# Patient Record
Sex: FEMALE | Race: WHITE | NOT HISPANIC OR LATINO | Employment: PART TIME | URBAN - METROPOLITAN AREA
[De-identification: names, ages, dates, MRNs, and addresses within clinical notes are randomized per-mention and may not be internally consistent; named-entity substitution may affect disease eponyms.]

---

## 2017-01-10 ENCOUNTER — COMMUNICATION - HEALTHEAST (OUTPATIENT)
Dept: PEDIATRICS | Facility: CLINIC | Age: 22
End: 2017-01-10

## 2017-12-06 ENCOUNTER — RECORDS - HEALTHEAST (OUTPATIENT)
Dept: ADMINISTRATIVE | Facility: OTHER | Age: 22
End: 2017-12-06

## 2018-01-11 ENCOUNTER — COMMUNICATION - HEALTHEAST (OUTPATIENT)
Dept: PEDIATRICS | Facility: CLINIC | Age: 23
End: 2018-01-11

## 2018-01-16 ENCOUNTER — OFFICE VISIT - HEALTHEAST (OUTPATIENT)
Dept: INTERNAL MEDICINE | Facility: CLINIC | Age: 23
End: 2018-01-16

## 2018-01-16 DIAGNOSIS — R00.2 PALPITATIONS: ICD-10-CM

## 2018-01-16 DIAGNOSIS — R59.0 LYMPHADENOPATHY OF RIGHT CERVICAL REGION: ICD-10-CM

## 2018-01-16 DIAGNOSIS — R51.9 NEW ONSET OF HEADACHES: ICD-10-CM

## 2018-01-16 LAB
ALBUMIN SERPL-MCNC: 4.3 G/DL (ref 3.5–5)
ALP SERPL-CCNC: 80 U/L (ref 45–120)
ALT SERPL W P-5'-P-CCNC: 14 U/L (ref 0–45)
ANION GAP SERPL CALCULATED.3IONS-SCNC: 12 MMOL/L (ref 5–18)
AST SERPL W P-5'-P-CCNC: 20 U/L (ref 0–40)
BILIRUB SERPL-MCNC: 0.9 MG/DL (ref 0–1)
BUN SERPL-MCNC: 12 MG/DL (ref 8–22)
CALCIUM SERPL-MCNC: 10.1 MG/DL (ref 8.5–10.5)
CHLORIDE BLD-SCNC: 104 MMOL/L (ref 98–107)
CO2 SERPL-SCNC: 25 MMOL/L (ref 22–31)
CREAT SERPL-MCNC: 0.69 MG/DL (ref 0.6–1.1)
ERYTHROCYTE [DISTWIDTH] IN BLOOD BY AUTOMATED COUNT: 10.5 % (ref 11–14.5)
ERYTHROCYTE [SEDIMENTATION RATE] IN BLOOD BY WESTERGREN METHOD: 2 MM/HR (ref 0–20)
GFR SERPL CREATININE-BSD FRML MDRD: >60 ML/MIN/1.73M2
GLUCOSE BLD-MCNC: 91 MG/DL (ref 70–125)
HCT VFR BLD AUTO: 45.3 % (ref 35–47)
HGB BLD-MCNC: 15.4 G/DL (ref 12–16)
MCH RBC QN AUTO: 31.6 PG (ref 27–34)
MCHC RBC AUTO-ENTMCNC: 33.9 G/DL (ref 32–36)
MCV RBC AUTO: 93 FL (ref 80–100)
PLATELET # BLD AUTO: 332 THOU/UL (ref 140–440)
PMV BLD AUTO: 6.3 FL (ref 7–10)
POTASSIUM BLD-SCNC: 4.4 MMOL/L (ref 3.5–5)
PROT SERPL-MCNC: 7.9 G/DL (ref 6–8)
RBC # BLD AUTO: 4.86 MILL/UL (ref 3.8–5.4)
SODIUM SERPL-SCNC: 141 MMOL/L (ref 136–145)
T3FREE SERPL-MCNC: 3.5 PG/ML (ref 1.9–3.9)
T4 FREE SERPL-MCNC: 1.1 NG/DL (ref 0.7–1.8)
TSH SERPL DL<=0.005 MIU/L-ACNC: 1.79 UIU/ML (ref 0.3–5)
WBC: 4.8 THOU/UL (ref 4–11)

## 2018-01-16 ASSESSMENT — MIFFLIN-ST. JEOR: SCORE: 1304.04

## 2018-01-19 ENCOUNTER — HOSPITAL ENCOUNTER (OUTPATIENT)
Dept: ULTRASOUND IMAGING | Facility: CLINIC | Age: 23
Discharge: HOME | End: 2018-01-19
Attending: INTERNAL MEDICINE

## 2018-01-19 ENCOUNTER — HOSPITAL ENCOUNTER (OUTPATIENT)
Dept: MRI IMAGING | Facility: CLINIC | Age: 23
Discharge: HOME | End: 2018-01-19
Attending: INTERNAL MEDICINE

## 2018-01-19 DIAGNOSIS — R51.9 NEW ONSET OF HEADACHES: ICD-10-CM

## 2018-01-19 DIAGNOSIS — R59.0 LYMPHADENOPATHY OF RIGHT CERVICAL REGION: ICD-10-CM

## 2018-01-24 ENCOUNTER — OFFICE VISIT - HEALTHEAST (OUTPATIENT)
Dept: INTERNAL MEDICINE | Facility: CLINIC | Age: 23
End: 2018-01-24

## 2018-01-24 DIAGNOSIS — G43.909 HEADACHE, MIGRAINE: ICD-10-CM

## 2018-01-24 DIAGNOSIS — F41.1 GENERALIZED ANXIETY DISORDER: ICD-10-CM

## 2018-05-21 ENCOUNTER — OFFICE VISIT - HEALTHEAST (OUTPATIENT)
Dept: INTERNAL MEDICINE | Facility: CLINIC | Age: 23
End: 2018-05-21

## 2018-05-21 DIAGNOSIS — B07.0 PLANTAR WART: ICD-10-CM

## 2018-05-24 ENCOUNTER — COMMUNICATION - HEALTHEAST (OUTPATIENT)
Dept: INTERNAL MEDICINE | Facility: CLINIC | Age: 23
End: 2018-05-24

## 2018-08-03 ENCOUNTER — OFFICE VISIT - HEALTHEAST (OUTPATIENT)
Dept: INTERNAL MEDICINE | Facility: CLINIC | Age: 23
End: 2018-08-03

## 2018-08-03 DIAGNOSIS — Z11.3 SCREEN FOR STD (SEXUALLY TRANSMITTED DISEASE): ICD-10-CM

## 2018-08-03 LAB — HIV 1+2 AB+HIV1 P24 AG SERPL QL IA: NEGATIVE

## 2018-08-03 ASSESSMENT — MIFFLIN-ST. JEOR: SCORE: 1325.25

## 2018-08-04 LAB
HBV SURFACE AG SERPL QL IA: NEGATIVE
T PALLIDUM AB SER QL: NEGATIVE

## 2018-08-06 LAB
C TRACH DNA SPEC QL PROBE+SIG AMP: NEGATIVE
HCV AB SERPL QL IA: NEGATIVE
N GONORRHOEA DNA SPEC QL NAA+PROBE: NEGATIVE

## 2019-04-15 ENCOUNTER — RECORDS - HEALTHEAST (OUTPATIENT)
Dept: ADMINISTRATIVE | Facility: OTHER | Age: 24
End: 2019-04-15

## 2019-09-25 ENCOUNTER — OFFICE VISIT - HEALTHEAST (OUTPATIENT)
Dept: INTERNAL MEDICINE | Facility: CLINIC | Age: 24
End: 2019-09-25

## 2019-09-25 DIAGNOSIS — J06.9 UPPER RESPIRATORY TRACT INFECTION, UNSPECIFIED TYPE: ICD-10-CM

## 2020-01-03 ENCOUNTER — OFFICE VISIT - HEALTHEAST (OUTPATIENT)
Dept: INTERNAL MEDICINE | Facility: CLINIC | Age: 25
End: 2020-01-03

## 2020-01-03 DIAGNOSIS — Z00.00 ENCOUNTER FOR PREVENTIVE CARE: ICD-10-CM

## 2020-01-03 LAB
ALBUMIN SERPL-MCNC: 4.1 G/DL (ref 3.5–5)
ALP SERPL-CCNC: 107 U/L (ref 45–120)
ALT SERPL W P-5'-P-CCNC: 31 U/L (ref 0–45)
ANION GAP SERPL CALCULATED.3IONS-SCNC: 8 MMOL/L (ref 5–18)
AST SERPL W P-5'-P-CCNC: 25 U/L (ref 0–40)
BILIRUB SERPL-MCNC: 0.7 MG/DL (ref 0–1)
BUN SERPL-MCNC: 10 MG/DL (ref 8–22)
CALCIUM SERPL-MCNC: 9.3 MG/DL (ref 8.5–10.5)
CHLORIDE BLD-SCNC: 107 MMOL/L (ref 98–107)
CHOLEST SERPL-MCNC: 130 MG/DL
CO2 SERPL-SCNC: 24 MMOL/L (ref 22–31)
CREAT SERPL-MCNC: 0.69 MG/DL (ref 0.6–1.1)
ERYTHROCYTE [DISTWIDTH] IN BLOOD BY AUTOMATED COUNT: 10.7 % (ref 11–14.5)
FASTING STATUS PATIENT QL REPORTED: YES
GFR SERPL CREATININE-BSD FRML MDRD: >60 ML/MIN/1.73M2
GLUCOSE BLD-MCNC: 96 MG/DL (ref 70–125)
HBA1C MFR BLD: 4.4 % (ref 3.5–6)
HCT VFR BLD AUTO: 40.3 % (ref 35–47)
HDLC SERPL-MCNC: 52 MG/DL
HGB BLD-MCNC: 14 G/DL (ref 12–16)
LDLC SERPL CALC-MCNC: 59 MG/DL
MCH RBC QN AUTO: 33 PG (ref 27–34)
MCHC RBC AUTO-ENTMCNC: 34.7 G/DL (ref 32–36)
MCV RBC AUTO: 95 FL (ref 80–100)
PLATELET # BLD AUTO: 317 THOU/UL (ref 140–440)
PMV BLD AUTO: 6.5 FL (ref 7–10)
POTASSIUM BLD-SCNC: 4 MMOL/L (ref 3.5–5)
PROT SERPL-MCNC: 7 G/DL (ref 6–8)
RBC # BLD AUTO: 4.25 MILL/UL (ref 3.8–5.4)
SODIUM SERPL-SCNC: 139 MMOL/L (ref 136–145)
TRIGL SERPL-MCNC: 93 MG/DL
TSH SERPL DL<=0.005 MIU/L-ACNC: 1.85 UIU/ML (ref 0.3–5)
WBC: 4.9 THOU/UL (ref 4–11)

## 2020-01-03 ASSESSMENT — MIFFLIN-ST. JEOR: SCORE: 1493.36

## 2020-01-03 ASSESSMENT — PATIENT HEALTH QUESTIONNAIRE - PHQ9: SUM OF ALL RESPONSES TO PHQ QUESTIONS 1-9: 0

## 2020-01-06 LAB
C TRACH DNA SPEC QL PROBE+SIG AMP: NEGATIVE
N GONORRHOEA DNA SPEC QL NAA+PROBE: NEGATIVE

## 2020-02-21 ENCOUNTER — OFFICE VISIT - HEALTHEAST (OUTPATIENT)
Dept: FAMILY MEDICINE | Facility: CLINIC | Age: 25
End: 2020-02-21

## 2020-02-21 DIAGNOSIS — N30.01 ACUTE CYSTITIS WITH HEMATURIA: ICD-10-CM

## 2020-02-21 DIAGNOSIS — R30.0 DYSURIA: ICD-10-CM

## 2020-02-21 DIAGNOSIS — A49.1 GROUP B STREPTOCOCCAL INFECTION: ICD-10-CM

## 2020-02-21 LAB
ALBUMIN UR-MCNC: NEGATIVE MG/DL
APPEARANCE UR: ABNORMAL
BACTERIA #/AREA URNS HPF: ABNORMAL HPF
BILIRUB UR QL STRIP: NEGATIVE
COLOR UR AUTO: YELLOW
GLUCOSE UR STRIP-MCNC: NEGATIVE MG/DL
HGB UR QL STRIP: ABNORMAL
KETONES UR STRIP-MCNC: NEGATIVE MG/DL
LEUKOCYTE ESTERASE UR QL STRIP: ABNORMAL
NITRATE UR QL: NEGATIVE
PH UR STRIP: 5.5 [PH] (ref 5–8)
RBC #/AREA URNS AUTO: ABNORMAL HPF
SP GR UR STRIP: 1.02 (ref 1–1.03)
SQUAMOUS #/AREA URNS AUTO: ABNORMAL LPF
UROBILINOGEN UR STRIP-ACNC: ABNORMAL
WBC #/AREA URNS AUTO: ABNORMAL HPF

## 2020-02-21 ASSESSMENT — MIFFLIN-ST. JEOR: SCORE: 1513.15

## 2020-02-22 LAB — BACTERIA SPEC CULT: ABNORMAL

## 2020-02-24 ENCOUNTER — COMMUNICATION - HEALTHEAST (OUTPATIENT)
Dept: FAMILY MEDICINE | Facility: CLINIC | Age: 25
End: 2020-02-24

## 2020-03-24 ENCOUNTER — RECORDS - HEALTHEAST (OUTPATIENT)
Dept: ADMINISTRATIVE | Facility: OTHER | Age: 25
End: 2020-03-24

## 2020-06-06 ENCOUNTER — OFFICE VISIT - HEALTHEAST (OUTPATIENT)
Dept: FAMILY MEDICINE | Facility: CLINIC | Age: 25
End: 2020-06-06

## 2020-06-06 DIAGNOSIS — R35.0 URINARY FREQUENCY: ICD-10-CM

## 2020-06-06 DIAGNOSIS — N30.90 BLADDER INFECTION: ICD-10-CM

## 2020-06-06 LAB
ALBUMIN UR-MCNC: NEGATIVE MG/DL
APPEARANCE UR: CLEAR
BACTERIA #/AREA URNS HPF: ABNORMAL HPF
BILIRUB UR QL STRIP: NEGATIVE
COLOR UR AUTO: YELLOW
GLUCOSE UR STRIP-MCNC: NEGATIVE MG/DL
HGB UR QL STRIP: ABNORMAL
KETONES UR STRIP-MCNC: NEGATIVE MG/DL
LEUKOCYTE ESTERASE UR QL STRIP: ABNORMAL
NITRATE UR QL: NEGATIVE
PH UR STRIP: 7 [PH] (ref 5–8)
RBC #/AREA URNS AUTO: ABNORMAL HPF
SP GR UR STRIP: 1.01 (ref 1–1.03)
SQUAMOUS #/AREA URNS AUTO: ABNORMAL LPF
UROBILINOGEN UR STRIP-ACNC: ABNORMAL
WBC #/AREA URNS AUTO: ABNORMAL HPF

## 2020-06-08 LAB — BACTERIA SPEC CULT: NORMAL

## 2020-06-28 ENCOUNTER — OFFICE VISIT - HEALTHEAST (OUTPATIENT)
Dept: FAMILY MEDICINE | Facility: CLINIC | Age: 25
End: 2020-06-28

## 2020-06-28 DIAGNOSIS — R30.0 BURNING WITH URINATION: ICD-10-CM

## 2020-06-28 DIAGNOSIS — N30.01 ACUTE CYSTITIS WITH HEMATURIA: ICD-10-CM

## 2020-06-28 DIAGNOSIS — N89.8 VAGINAL DISCHARGE: ICD-10-CM

## 2020-06-28 LAB
ALBUMIN UR-MCNC: ABNORMAL MG/DL
APPEARANCE UR: ABNORMAL
BACTERIA #/AREA URNS HPF: ABNORMAL HPF
BILIRUB UR QL STRIP: NEGATIVE
CLUE CELLS: NORMAL
COLOR UR AUTO: YELLOW
GLUCOSE UR STRIP-MCNC: NEGATIVE MG/DL
HGB UR QL STRIP: ABNORMAL
KETONES UR STRIP-MCNC: NEGATIVE MG/DL
LEUKOCYTE ESTERASE UR QL STRIP: ABNORMAL
NITRATE UR QL: NEGATIVE
PH UR STRIP: 6 [PH] (ref 5–8)
RBC #/AREA URNS AUTO: ABNORMAL HPF
SP GR UR STRIP: 1.02 (ref 1–1.03)
SQUAMOUS #/AREA URNS AUTO: ABNORMAL LPF
TRICHOMONAS, WET PREP: NORMAL
UROBILINOGEN UR STRIP-ACNC: ABNORMAL
WBC #/AREA URNS AUTO: >100 HPF
YEAST, WET PREP: NORMAL

## 2020-06-28 ASSESSMENT — MIFFLIN-ST. JEOR: SCORE: 1461.89

## 2020-06-29 LAB — BACTERIA SPEC CULT: ABNORMAL

## 2020-06-30 LAB
C TRACH DNA SPEC QL PROBE+SIG AMP: NEGATIVE
N GONORRHOEA DNA SPEC QL NAA+PROBE: NEGATIVE

## 2020-07-21 ENCOUNTER — COMMUNICATION - HEALTHEAST (OUTPATIENT)
Dept: SCHEDULING | Facility: CLINIC | Age: 25
End: 2020-07-21

## 2020-07-21 DIAGNOSIS — R30.0 DYSURIA: ICD-10-CM

## 2020-07-27 ENCOUNTER — COMMUNICATION - HEALTHEAST (OUTPATIENT)
Dept: SCHEDULING | Facility: CLINIC | Age: 25
End: 2020-07-27

## 2020-07-27 ENCOUNTER — OFFICE VISIT - HEALTHEAST (OUTPATIENT)
Dept: FAMILY MEDICINE | Facility: CLINIC | Age: 25
End: 2020-07-27

## 2020-07-27 DIAGNOSIS — N39.0 RECURRENT UTI (URINARY TRACT INFECTION): ICD-10-CM

## 2020-07-27 DIAGNOSIS — R30.0 DYSURIA: ICD-10-CM

## 2020-07-27 LAB
ALBUMIN UR-MCNC: ABNORMAL MG/DL
APPEARANCE UR: ABNORMAL
BACTERIA #/AREA URNS HPF: ABNORMAL /[HPF]
BILIRUB UR QL STRIP: NEGATIVE
COLOR UR AUTO: YELLOW
GLUCOSE UR STRIP-MCNC: NEGATIVE MG/DL
HGB UR QL STRIP: ABNORMAL
KETONES UR STRIP-MCNC: NEGATIVE MG/DL
LEUKOCYTE ESTERASE UR QL STRIP: ABNORMAL
NITRATE UR QL: NEGATIVE
PH UR STRIP: 5.5 [PH] (ref 5–8)
RBC #/AREA URNS AUTO: ABNORMAL /[HPF]
SP GR UR STRIP: >=1.03 (ref 1–1.03)
SQUAMOUS #/AREA URNS AUTO: ABNORMAL /[HPF]
UROBILINOGEN UR STRIP-ACNC: ABNORMAL
WBC #/AREA URNS AUTO: ABNORMAL /[HPF]

## 2020-07-28 ENCOUNTER — COMMUNICATION - HEALTHEAST (OUTPATIENT)
Dept: INTERNAL MEDICINE | Facility: CLINIC | Age: 25
End: 2020-07-28

## 2020-07-28 DIAGNOSIS — N39.0 CHRONIC UTI: ICD-10-CM

## 2020-07-28 LAB — BACTERIA SPEC CULT: NO GROWTH

## 2020-08-03 ENCOUNTER — COMMUNICATION - HEALTHEAST (OUTPATIENT)
Dept: INTERNAL MEDICINE | Facility: CLINIC | Age: 25
End: 2020-08-03

## 2020-08-03 ENCOUNTER — OFFICE VISIT - HEALTHEAST (OUTPATIENT)
Dept: INTERNAL MEDICINE | Facility: CLINIC | Age: 25
End: 2020-08-03

## 2020-08-03 ENCOUNTER — COMMUNICATION - HEALTHEAST (OUTPATIENT)
Dept: SCHEDULING | Facility: CLINIC | Age: 25
End: 2020-08-03

## 2020-08-03 ENCOUNTER — AMBULATORY - HEALTHEAST (OUTPATIENT)
Dept: LAB | Facility: CLINIC | Age: 25
End: 2020-08-03

## 2020-08-03 DIAGNOSIS — N39.0 RECURRENT UTI: ICD-10-CM

## 2020-08-03 DIAGNOSIS — R30.0 DYSURIA: ICD-10-CM

## 2020-08-03 DIAGNOSIS — N30.01 ACUTE CYSTITIS WITH HEMATURIA: ICD-10-CM

## 2020-08-03 LAB
ALBUMIN UR-MCNC: NEGATIVE MG/DL
APPEARANCE UR: CLEAR
BILIRUB UR QL STRIP: NEGATIVE
COLOR UR AUTO: YELLOW
GLUCOSE UR STRIP-MCNC: NEGATIVE MG/DL
HGB UR QL STRIP: NEGATIVE
KETONES UR STRIP-MCNC: NEGATIVE MG/DL
LEUKOCYTE ESTERASE UR QL STRIP: NEGATIVE
NITRATE UR QL: NEGATIVE
PH UR STRIP: 5.5 [PH] (ref 5–8)
SP GR UR STRIP: 1.01 (ref 1–1.03)
UROBILINOGEN UR STRIP-ACNC: NORMAL

## 2020-08-12 ENCOUNTER — HOSPITAL ENCOUNTER (OUTPATIENT)
Dept: ULTRASOUND IMAGING | Facility: HOSPITAL | Age: 25
Discharge: HOME | End: 2020-08-12
Attending: INTERNAL MEDICINE

## 2020-08-12 DIAGNOSIS — N39.0 RECURRENT UTI: ICD-10-CM

## 2020-08-12 DIAGNOSIS — N30.01 ACUTE CYSTITIS WITH HEMATURIA: ICD-10-CM

## 2020-08-13 ENCOUNTER — OFFICE VISIT - HEALTHEAST (OUTPATIENT)
Dept: FAMILY MEDICINE | Facility: CLINIC | Age: 25
End: 2020-08-13

## 2020-08-13 DIAGNOSIS — N30.90 RECURRENT CYSTITIS: ICD-10-CM

## 2020-08-13 LAB
BACTERIA #/AREA URNS HPF: ABNORMAL HPF
RBC #/AREA URNS AUTO: ABNORMAL HPF
SQUAMOUS #/AREA URNS AUTO: ABNORMAL LPF
WBC #/AREA URNS AUTO: >100 HPF

## 2020-08-14 LAB — BACTERIA SPEC CULT: ABNORMAL

## 2020-08-18 ENCOUNTER — OFFICE VISIT - HEALTHEAST (OUTPATIENT)
Dept: INTERNAL MEDICINE | Facility: CLINIC | Age: 25
End: 2020-08-18

## 2020-08-18 DIAGNOSIS — Z30.09 GENERAL COUNSELING FOR PRESCRIPTION OF ORAL CONTRACEPTIVES: ICD-10-CM

## 2020-08-18 DIAGNOSIS — Z00.00 ENCOUNTER FOR PREVENTIVE CARE: ICD-10-CM

## 2020-08-18 DIAGNOSIS — Z30.430 ENCOUNTER FOR IUD INSERTION: ICD-10-CM

## 2020-08-18 DIAGNOSIS — R19.7 DIARRHEA, UNSPECIFIED TYPE: ICD-10-CM

## 2020-08-18 DIAGNOSIS — N39.0 RECURRENT UTI: ICD-10-CM

## 2020-08-18 LAB
ANION GAP SERPL CALCULATED.3IONS-SCNC: 13 MMOL/L (ref 5–18)
BUN SERPL-MCNC: 12 MG/DL (ref 8–22)
CALCIUM SERPL-MCNC: 10 MG/DL (ref 8.5–10.5)
CHLORIDE BLD-SCNC: 100 MMOL/L (ref 98–107)
CLUE CELLS: NORMAL
CO2 SERPL-SCNC: 25 MMOL/L (ref 22–31)
CREAT SERPL-MCNC: 0.75 MG/DL (ref 0.6–1.1)
GFR SERPL CREATININE-BSD FRML MDRD: >60 ML/MIN/1.73M2
GLUCOSE BLD-MCNC: 90 MG/DL (ref 70–125)
HCG UR QL: NEGATIVE
HIV 1+2 AB+HIV1 P24 AG SERPL QL IA: NEGATIVE
POTASSIUM BLD-SCNC: 4.5 MMOL/L (ref 3.5–5)
SODIUM SERPL-SCNC: 138 MMOL/L (ref 136–145)
TRICHOMONAS, WET PREP: NORMAL
YEAST, WET PREP: NORMAL

## 2020-08-18 ASSESSMENT — MIFFLIN-ST. JEOR: SCORE: 1459.62

## 2020-08-19 LAB
C TRACH DNA SPEC QL PROBE+SIG AMP: NEGATIVE
N GONORRHOEA DNA SPEC QL NAA+PROBE: NEGATIVE
T PALLIDUM AB SER QL: NEGATIVE

## 2021-03-17 ENCOUNTER — OFFICE VISIT - HEALTHEAST (OUTPATIENT)
Dept: INTERNAL MEDICINE | Facility: CLINIC | Age: 26
End: 2021-03-17

## 2021-03-17 DIAGNOSIS — Z00.00 ENCOUNTER FOR PREVENTIVE CARE: ICD-10-CM

## 2021-03-17 DIAGNOSIS — J30.89 SEASONAL ALLERGIC RHINITIS DUE TO OTHER ALLERGIC TRIGGER: ICD-10-CM

## 2021-03-17 DIAGNOSIS — N39.0 RECURRENT UTI: ICD-10-CM

## 2021-03-17 DIAGNOSIS — R59.9 ENLARGED LYMPH NODES: ICD-10-CM

## 2021-03-17 DIAGNOSIS — H93.8X1 PRESSURE SENSATION IN RIGHT EAR: ICD-10-CM

## 2021-03-17 DIAGNOSIS — R45.86 MOOD SWINGS: ICD-10-CM

## 2021-03-17 LAB
ALBUMIN UR-MCNC: NEGATIVE G/DL
ANION GAP SERPL CALCULATED.3IONS-SCNC: 10 MMOL/L (ref 5–18)
APPEARANCE UR: CLEAR
BACTERIA #/AREA URNS HPF: ABNORMAL /[HPF]
BILIRUB UR QL STRIP: NEGATIVE
BUN SERPL-MCNC: 11 MG/DL (ref 8–22)
CALCIUM SERPL-MCNC: 8.8 MG/DL (ref 8.5–10.5)
CHLORIDE BLD-SCNC: 105 MMOL/L (ref 98–107)
CHOLEST SERPL-MCNC: 137 MG/DL
CO2 SERPL-SCNC: 24 MMOL/L (ref 22–31)
COLOR UR AUTO: YELLOW
CREAT SERPL-MCNC: 0.71 MG/DL (ref 0.6–1.1)
ERYTHROCYTE [DISTWIDTH] IN BLOOD BY AUTOMATED COUNT: 11.8 % (ref 11–14.5)
FASTING STATUS PATIENT QL REPORTED: YES
GFR SERPL CREATININE-BSD FRML MDRD: >60 ML/MIN/1.73M2
GLUCOSE BLD-MCNC: 89 MG/DL (ref 70–125)
GLUCOSE UR STRIP-MCNC: NEGATIVE MG/DL
HCT VFR BLD AUTO: 37.4 % (ref 35–47)
HDLC SERPL-MCNC: 54 MG/DL
HGB BLD-MCNC: 12.7 G/DL (ref 12–16)
HGB UR QL STRIP: NEGATIVE
KETONES UR STRIP-MCNC: NEGATIVE MG/DL
LDLC SERPL CALC-MCNC: 43 MG/DL
LEUKOCYTE ESTERASE UR QL STRIP: ABNORMAL
MCH RBC QN AUTO: 32.6 PG (ref 27–34)
MCHC RBC AUTO-ENTMCNC: 34 G/DL (ref 32–36)
MCV RBC AUTO: 96 FL (ref 80–100)
NITRATE UR QL: NEGATIVE
PH UR STRIP: 6.5 [PH] (ref 5–8)
PLATELET # BLD AUTO: 273 THOU/UL (ref 140–440)
PMV BLD AUTO: 8.3 FL (ref 7–10)
POTASSIUM BLD-SCNC: 4.3 MMOL/L (ref 3.5–5)
RBC # BLD AUTO: 3.89 MILL/UL (ref 3.8–5.4)
RBC #/AREA URNS AUTO: ABNORMAL HPF
SODIUM SERPL-SCNC: 139 MMOL/L (ref 136–145)
SP GR UR STRIP: 1.02 (ref 1–1.03)
SQUAMOUS #/AREA URNS AUTO: ABNORMAL LPF
TRIGL SERPL-MCNC: 199 MG/DL
TSH SERPL DL<=0.005 MIU/L-ACNC: 1.24 UIU/ML (ref 0.3–5)
UROBILINOGEN UR STRIP-ACNC: ABNORMAL
WBC #/AREA URNS AUTO: ABNORMAL HPF
WBC: 4.6 THOU/UL (ref 4–11)

## 2021-03-17 RX ORDER — PREDNISONE 20 MG/1
40 TABLET ORAL DAILY
Qty: 10 TABLET | Refills: 0 | Status: SHIPPED | OUTPATIENT
Start: 2021-03-17 | End: 2023-10-16

## 2021-03-17 RX ORDER — DROSPIRENONE AND ETHINYL ESTRADIOL 0.03MG-3MG
1 KIT ORAL DAILY
Status: SHIPPED | COMMUNITY
Start: 2020-12-28 | End: 2023-10-16

## 2021-03-17 RX ORDER — SODIUM CHLORIDE 0.65 %
AEROSOL, SPRAY (ML) NASAL
Status: SHIPPED | COMMUNITY
Start: 2021-03-08 | End: 2023-10-16

## 2021-03-17 ASSESSMENT — MIFFLIN-ST. JEOR: SCORE: 1431.04

## 2021-03-18 LAB — BACTERIA SPEC CULT: NO GROWTH

## 2021-03-19 ENCOUNTER — HOSPITAL ENCOUNTER (OUTPATIENT)
Dept: ULTRASOUND IMAGING | Facility: HOSPITAL | Age: 26
Discharge: HOME | End: 2021-03-19
Attending: INTERNAL MEDICINE

## 2021-03-19 DIAGNOSIS — R59.9 ENLARGED LYMPH NODES: ICD-10-CM

## 2021-05-27 VITALS
DIASTOLIC BLOOD PRESSURE: 88 MMHG | OXYGEN SATURATION: 98 % | HEART RATE: 79 BPM | RESPIRATION RATE: 12 BRPM | SYSTOLIC BLOOD PRESSURE: 122 MMHG | TEMPERATURE: 98 F

## 2021-05-27 VITALS
RESPIRATION RATE: 18 BRPM | OXYGEN SATURATION: 100 % | SYSTOLIC BLOOD PRESSURE: 133 MMHG | HEART RATE: 97 BPM | TEMPERATURE: 98.1 F | DIASTOLIC BLOOD PRESSURE: 95 MMHG

## 2021-05-27 ASSESSMENT — PATIENT HEALTH QUESTIONNAIRE - PHQ9: SUM OF ALL RESPONSES TO PHQ QUESTIONS 1-9: 0

## 2021-05-28 ENCOUNTER — RECORDS - HEALTHEAST (OUTPATIENT)
Dept: ADMINISTRATIVE | Facility: CLINIC | Age: 26
End: 2021-05-28

## 2021-05-31 VITALS — WEIGHT: 123.7 LBS | HEIGHT: 65 IN | BODY MASS INDEX: 20.61 KG/M2

## 2021-05-31 VITALS — BODY MASS INDEX: 21.29 KG/M2 | WEIGHT: 126 LBS

## 2021-06-01 VITALS — WEIGHT: 132 LBS | BODY MASS INDEX: 22.31 KG/M2

## 2021-06-01 VITALS — WEIGHT: 128.38 LBS | BODY MASS INDEX: 21.39 KG/M2 | HEIGHT: 65 IN

## 2021-06-01 NOTE — PROGRESS NOTES
[facility] Follow Up Note    Maria Fernanda Payton   24 y.o. female    Date of Visit: 9/25/2019    Chief Complaint   Patient presents with     Sore Throat     12 days, head presure     Ear Pain     Subjective  This is a 24-year-old young lady who is a patient of Dr. Angela Payne.  She comes in with a 12-day history of a sore throat.  The discomfort is primarily on the right side and radiates towards her ear and neck region.  Her left side is been fine.  No hearing difficulties or drainage from the ear.  Minimal swallowing discomfort.  She says she has felt warm but has not taken her temperature and has noticed no chills.  She denies any cough.  She is somewhat fatigued.  She is here today with her mother.  The symptoms have been going on is noted above for about 12 days.    ROS A comprehensive review of systems was performed and was otherwise negative    Medications, allergies, and problem list were reviewed and updated    Exam  General Appearance:   On examination her blood pressure is 122/70.  Weight is 158 pounds and BMI is 26.70.    Heart rhythm is stable with a rate of 80 and no ectopy.    No facial tenderness.    There are couple of small enlarged cervical lymph nodes on the right side.    Throat shows a little bit of tonsillar swelling on the right but minimal erythema and no exudate.    Right ear is examined and shows an open clean canal with no sign of external infection and a normal tympanic membrane.    The patient is alert and oriented x3.      Assessment/Plan  1. Upper respiratory tract infection, unspecified type  azithromycin (ZITHROMAX Z-LARRY) 250 MG tablet     Persistent symptoms of a respiratory infection.  I discussed this with the patient and her mother.  If the symptoms have been present for just a couple of days I would probably not to treat her but given that it has been going on for 12 days and she is not improving I thought we would try her on a Z-Larry and have her  follow-up with her physician if she is not improving.  Body Mass Index was not assessed due to Patient was in with an acute medical issue..    Case Jung MD      Current Outpatient Medications on File Prior to Visit   Medication Sig     drospirenone-ethinyl estradiol (ROLANDO) 3-0.03 mg per tablet TAKE 1 TABLET BY MOUTH DAILY     No current facility-administered medications on file prior to visit.      Allergies   Allergen Reactions     Amoxicillin      Social History     Tobacco Use     Smoking status: Never Smoker     Smokeless tobacco: Never Used   Substance Use Topics     Alcohol use: Not on file     Drug use: Not on file

## 2021-06-03 VITALS
SYSTOLIC BLOOD PRESSURE: 122 MMHG | OXYGEN SATURATION: 99 % | HEART RATE: 80 BPM | BODY MASS INDEX: 26.7 KG/M2 | WEIGHT: 158 LBS | DIASTOLIC BLOOD PRESSURE: 70 MMHG

## 2021-06-04 VITALS
HEIGHT: 65 IN | SYSTOLIC BLOOD PRESSURE: 116 MMHG | BODY MASS INDEX: 27.56 KG/M2 | WEIGHT: 165.44 LBS | DIASTOLIC BLOOD PRESSURE: 82 MMHG | HEART RATE: 102 BPM | OXYGEN SATURATION: 100 %

## 2021-06-04 VITALS
TEMPERATURE: 98.4 F | WEIGHT: 158.5 LBS | DIASTOLIC BLOOD PRESSURE: 79 MMHG | HEART RATE: 92 BPM | HEIGHT: 65 IN | OXYGEN SATURATION: 97 % | BODY MASS INDEX: 26.41 KG/M2 | SYSTOLIC BLOOD PRESSURE: 114 MMHG | RESPIRATION RATE: 16 BRPM

## 2021-06-04 VITALS
HEIGHT: 65 IN | SYSTOLIC BLOOD PRESSURE: 128 MMHG | HEART RATE: 110 BPM | DIASTOLIC BLOOD PRESSURE: 74 MMHG | OXYGEN SATURATION: 98 % | BODY MASS INDEX: 26.33 KG/M2 | WEIGHT: 158 LBS

## 2021-06-04 VITALS
DIASTOLIC BLOOD PRESSURE: 86 MMHG | HEART RATE: 76 BPM | TEMPERATURE: 97.6 F | RESPIRATION RATE: 16 BRPM | HEIGHT: 65 IN | BODY MASS INDEX: 28.29 KG/M2 | SYSTOLIC BLOOD PRESSURE: 108 MMHG | WEIGHT: 169.8 LBS

## 2021-06-04 VITALS
BODY MASS INDEX: 27.44 KG/M2 | HEART RATE: 74 BPM | RESPIRATION RATE: 16 BRPM | TEMPERATURE: 98.1 F | DIASTOLIC BLOOD PRESSURE: 87 MMHG | WEIGHT: 162.38 LBS | SYSTOLIC BLOOD PRESSURE: 127 MMHG | OXYGEN SATURATION: 99 %

## 2021-06-04 NOTE — PROGRESS NOTES
Assessment and Plan:     1. Encounter for preventive care  Up-to-date on dental care.  Due for a tetanus shot.  Recommend also a nonemergent eye exam at her convenience  Have discussed her elevated body mass index.  Encourage reduction of starchy carbs given family history of type 2 diabetes.  Also encouraged regular exercise.  - Chlamydia trachomatis & Neisseria gonorrhoeae, Amplified Detection  - Gynecologic Cytology (PAP Smear)  - HM2(CBC w/o Differential)  - Comprehensive Metabolic Panel  - Thyroid Cascade  - Glycosylated Hemoglobin A1c  - Lipid Cascade FASTING      Follow-up every 1 to 2 years      Angela Payne MD  1/3/2020    Chief Complaint:  Chief Complaint   Patient presents with     Annual Exam     fasting       History of Present Illness:  Maria Fernanda is a 24 y.o. female who is here today for an annual physical examination.  Of note, she has no chief complaints.  She is a senior in college and will be graduating in the spring.  She delivers pizzas.  She has a monogamous boyfriend who resides within her home with her family.  She is sexually active and uses condoms.  She is off birth control pills.  She drinks alcohol socially on the weekends.    Health maintenance is reviewed.  She is due for a tetanus shot.  Additionally, she is up-to-date on a flu shot.    Gynecology review of systems: She is sexually active and uses a condom.  She has been checked for STDs regularly.  She is due for a Pap smear.  She has periods every 30 days.  They are stable with no unusual patterns.    Review of Systems:    The rest of the review of systems are negative for all systems.    PFSH:  Social History: She is single.  She is a college senior.  She is living at home with her parents.  Social History     Tobacco Use   Smoking Status Never Smoker   Smokeless Tobacco Never Used       Past Medical History: No past medical history on file.    Allergies   Allergen Reactions     Amoxicillin        Family History: Her mother has  "hypertension, hyper triglyceridemia and diabetes  Family History   Problem Relation Age of Onset     Hypothyroidism Mother         and multiple maternal family members with hypothyroidism     Hypertension Unknown      Hyperlipidemia Unknown      Diabetes Unknown        Physical Exam:  Vitals:    01/03/20 1145   BP: 116/82   Patient Site: Left Arm   Patient Position: Sitting   Cuff Size: Adult Regular   Pulse: (!) 102   SpO2: 100%   Weight: 165 lb 7 oz (75 kg)   Height: 5' 4.5\" (1.638 m)     Wt Readings from Last 3 Encounters:   01/03/20 165 lb 7 oz (75 kg)   09/25/19 158 lb (71.7 kg)   08/03/18 128 lb 6 oz (58.2 kg)       General Appearance:  Alert, cooperative, no distress, appears stated age   Head:  Normocephalic, without obvious abnormality, atraumatic   Eyes:  PERRL, conjunctiva/corneas clear, EOM's intact   Ears:  Normal TM's and external ear canals, both ears   Nose: Nares normal, septum midline,mucosa normal, no drainage    Throat: Lips, mucosa, and tongue normal; teeth and gums normal   Neck: Supple, symmetrical, trachea midline, no adenopathy;  thyroid: not enlarged, symmetric, no tenderness/mass/nodules; no carotid bruit or JVD   Back:   Symmetric, no curvature, ROM normal, no CVA tenderness   Lungs:   Clear to auscultation bilaterally, respirations unlabored   Breasts:  No breast masses, tenderness, asymmetry, or nipple discharge.   Heart:  Regular rate and rhythm, S1 and S2 normal, no murmur, rub, or gallop   Abdomen:   Soft, non-tender, bowel sounds active all four quadrants,  no masses, no organomegaly   Genitalia: Normally developed genitalia with no external lesions or eruptions.  Vagina and cervix show no lesions, inflammation, discharge or tenderness.  No cystocele.  Uterus normal size and position.  No adnexal mass or tenderness.   Rectal:  No hemorrhoids   Extremities: Extremities normal, atraumatic, no cyanosis or edema   Skin: Skin color, texture, turgor normal, no rashes or lesions   Lymph " nodes: Cervical, supraclavicular, and axillary nodes normal   Neurologic: Normal, CN 2-12 intact     Medications:  No current outpatient medications on file.     No current facility-administered medications for this visit.        Immunizations:  Immunization History   Administered Date(s) Administered     DTaP, historic 1996, 01/21/1997, 03/13/1997, 12/19/1997, 01/17/2002     HPV Quadrivalent 09/14/2010, 12/10/2010, 06/30/2011     Hep A, historic 10/07/2008, 04/20/2009     Hep B, historic 1996, 01/21/1997, 06/17/1997     HiB, historic,unspecified 1996, 01/21/1997, 03/13/1997, 12/19/1997     IPV 1996, 01/21/1997, 03/13/1997, 01/17/2002     Influenza, Seasonal, Inj PF IIV3 10/14/2013     Influenza, inj, historic,unspecified 10/08/1998, 11/05/1998, 10/28/2002, 11/05/2003, 10/27/2006     MMR 09/23/1997, 01/17/2002     Meningococcal MCV4P 10/07/2008, 10/10/2012     Tdap 10/07/2008     Varicella 09/23/1997, 10/07/2008

## 2021-06-05 VITALS
BODY MASS INDEX: 23.82 KG/M2 | WEIGHT: 148.2 LBS | HEIGHT: 66 IN | DIASTOLIC BLOOD PRESSURE: 70 MMHG | SYSTOLIC BLOOD PRESSURE: 100 MMHG | OXYGEN SATURATION: 99 % | HEART RATE: 88 BPM

## 2021-06-06 NOTE — TELEPHONE ENCOUNTER
Patient Returning Call  Reason for call:  Returning phone call   Information relayed to patient:  Message below relayed   Patient has additional questions:  No  If YES, what are your questions/concerns:  n/a  Okay to leave a detailed message?: No call back needed

## 2021-06-06 NOTE — PROGRESS NOTES
Assessment/ Plan:     1. Dysuria  Urinalysis-UC if Indicated   2. Acute cystitis with hematuria  sulfamethoxazole-trimethoprim (BACTRIM DS) 800-160 mg per tablet     Urinalysis shows large leukocytes and blood.  It was also significantly cloudy.  This was sent for culture and micro.  Based on history of symptoms and urinalysis results I will try treating patient with Bactrim to see if symptoms will improve.  Patient be notified of the results of the culture when available.  Discussed risks and benefits of antibiotic use as well as potential side effects.  Advised patient to try increasing her water intake to on average 75 to 90 mL daily.  Follow-up if symptoms are not improving or worsening.    I did review labs from her visit with her physical.  She had STD screening completed at that time as well.  She is not diabetic.  No kidney dysfunction.  No STIs.  If symptoms persist or fail to improve on antibiotic and there is no infection present would consider sending patient to urology for further evaluation.  She is in agreement with this plan.        Subjective:      Maria Fernanda Payton is a 24 y.o. female who presents for concerns of possible uti. She will have 1-2 days per week of increased discomfort and bladder pressure.  No significant frequency or dysuria has been present.  She has had symptoms on and off for the last 2 months.  She has noticed cloudy urine.  And notes that when her urine is more cloudy is when her symptoms are increased.  When she drinks more water her symptoms reduce.  She has not been treated recently for a UTI.   Labs were normal beginning of January when she had her annual physical.  She reports on average she drinks to about a half a gallon of water per day.    The following portions of the patient's history were reviewed and updated as appropriate: allergies, current medications and problem list.    Patient Active Problem List   Diagnosis     Allergies     HSV infection       Current  "Outpatient Medications   Medication Sig     sulfamethoxazole-trimethoprim (BACTRIM DS) 800-160 mg per tablet Take 1 tablet by mouth 2 (two) times a day for 3 days.       Review of Systems   A 12 point comprehensive review of systems was negative except as noted.      Objective:      /86   Pulse 76   Temp 97.6  F (36.4  C) (Oral)   Resp 16   Ht 5' 4.5\" (1.638 m)   Wt 169 lb 12.8 oz (77 kg)   LMP 02/08/2020 (Approximate)   BMI 28.70 kg/m      General appearance: alert, appears stated age and cooperative  Head: Normocephalic, without obvious abnormality, atraumatic  Back: symmetric, no curvature. ROM normal. No CVA tenderness.  Lungs: clear to auscultation bilaterally  Heart: regular rate and rhythm, S1, S2 normal, no murmur, click, rub or gallop  Abdomen: soft, non-tender; bowel sounds normal; no masses,  no organomegaly  Neurologic: Grossly normal      I personally spent 25 minutes spent together with the patient today, more than 50% spent in counseling, discussing the above topics.    Aziza Carrillo, JUVE  12:44 PM  "

## 2021-06-06 NOTE — TELEPHONE ENCOUNTER
Left message to call back for: Maria Fernanda    Information to relay to patient:  LMTCB    Please advise patient as below from Aziza Carrillo CNP      ----- Message from Aziza Carrillo CNP sent at 2/24/2020  8:46 AM CST -----  Please call patient and let her know that I am going to change her antibiotic as the Bactrim I started her on will not appropriately treat her infection.    Maria Fernanda,   Urine culture shows a UTI secondary to group B strep.  Unfortunately the antibiotic I started you on will not appropriately treat this infection.  I am going to change your antibiotic and send this in to your pharmacy.  JUVE Hallman

## 2021-06-09 NOTE — PROGRESS NOTES
"Chief Complaint   Patient presents with     Urinary Urgency     burning, discomfort--- X2 days. UTI earlier this month      Work note     needs work note for today 6/28       HPI:  Maria Fernanda Payton is a 24 y.o. female who presents today complaining of burning and urinary frequency for the past 2 days.  Patient was diagnosed with a urinary tract infection on 6/6/2020.  Her urine culture showed a mixture of urogenital organisms.  Patient has been treated with Macrobid. Patient has had 3 sexual partners in the past 2 months. She participates in both vaginal and oral sex.  Her sexual partners are male.  She denies any chance of pregnancy.  She declines any serum testing for STDs, but is interested in gonorrhea and Chlamydia testing.  She states that she is had a slight increase of vaginal discharge in comparison to normal.  She denies any significant vaginal odor or itching.    History obtained from the patient.    Problem List:  2016-06: HSV infection  Genitourinary Candidiasis  Urinary Tract Infection  Allergies  Chlamydial Infections  Abdominal Pain      No past medical history on file.    Social History     Tobacco Use     Smoking status: Never Smoker     Smokeless tobacco: Never Used   Substance Use Topics     Alcohol use: Not on file       Review of Systems   Constitutional: Negative for chills and fever.   Gastrointestinal: Negative for abdominal pain, nausea and vomiting.   Genitourinary: Positive for dysuria, frequency, hematuria and vaginal discharge. Negative for flank pain and vaginal pain.       Vitals:    06/28/20 1116   BP: 114/79   Patient Site: Right Arm   Patient Position: Sitting   Cuff Size: Adult Regular   Pulse: 92   Resp: 16   Temp: 98.4  F (36.9  C)   TempSrc: Oral   SpO2: 97%   Weight: 158 lb 8 oz (71.9 kg)   Height: 5' 4.5\" (1.638 m)       Physical Exam  Constitutional:       General: She is not in acute distress.     Appearance: She is well-developed. She is not diaphoretic.   HENT:      " Head: Normocephalic and atraumatic.      Right Ear: External ear normal.      Left Ear: External ear normal.   Eyes:      General:         Right eye: No discharge.         Left eye: No discharge.      Conjunctiva/sclera: Conjunctivae normal.   Cardiovascular:      Rate and Rhythm: Normal rate and regular rhythm.      Heart sounds: Normal heart sounds.   Pulmonary:      Effort: Pulmonary effort is normal. No respiratory distress.      Breath sounds: Normal breath sounds.   Psychiatric:         Behavior: Behavior normal.         Thought Content: Thought content normal.         Judgment: Judgment normal.       Labs:  Recent Results (from the past 72 hour(s))   Urinalysis-UC if Indicated   Result Value Ref Range    Color, UA Yellow Colorless, Yellow, Straw, Light Yellow    Clarity, UA Cloudy (!) Clear    Glucose, UA Negative Negative    Bilirubin, UA Negative Negative    Ketones, UA Negative Negative    Specific Gravity, UA 1.020 1.005 - 1.030    Blood, UA Moderate (!) Negative    pH, UA 6.0 5.0 - 8.0    Protein, UA 30 mg/dL (!) Negative mg/dL    Urobilinogen, UA 0.2 E.U./dL 0.2 E.U./dL, 1.0 E.U./dL    Nitrite, UA Negative Negative    Leukocytes, UA Moderate (!) Negative    Bacteria, UA Few (!) None Seen hpf    RBC, UA 10-25 (!) None Seen, 0-2 hpf    WBC, UA >100 (!) None Seen, 0-5 hpf    Squam Epithel, UA 0-5 None Seen, 0-5 lpf   Wet Prep, Vaginal    Specimen: Genital   Result Value Ref Range    Yeast Result No yeast seen No yeast seen    Trichomonas No Trichomonas seen No Trichomonas seen    Clue Cells, Wet Prep No Clue cells seen No Clue cells seen         Clinical Decision Making:  UA is indicative of suspected urinary tract infection.  Patient was started on Bactrim for 7 days.  Wet prep was negative today, GC/CT is pending.  At the end of the encounter, I discussed results, diagnosis, medications. Discussed red flags for immediate return to clinic/ER, as well as indications for follow up if no improvement. Patient  understood and agreed to plan. Patient was stable for discharge.    1. Acute cystitis with hematuria  sulfamethoxazole-trimethoprim (SEPTRA DS) 800-160 mg per tablet   2. Burning with urination  Urinalysis-UC if Indicated    Culture, Urine   3. Vaginal discharge  Chlamydia trachomatis & Neisseria gonorrhoeae, Amplified Detection    Wet Prep, Vaginal         Patient Instructions   1. Increase fluid intake  2. Complete antibiotic regimen as prescribed. You will be notified if the treatment plan needs to be changed based on the urine culture results.   3. Follow if you have a fever of 100.4 F (38 C) or higher, no improvement after three days of treatment, trouble urinating because of pain,new or increased discharge from the vagina, rash or joint pain, Increased back or abdominal pain.  4. You will be notified of you wet prep and Chlamydia and Gonorrhea test results when they become available.

## 2021-06-09 NOTE — PATIENT INSTRUCTIONS - HE
1. Increase fluid intake  2. Complete antibiotic regimen as prescribed. You will be notified if the treatment plan needs to be changed based on the urine culture results.   3. Follow if you have a fever of 100.4 F (38 C) or higher, no improvement after three days of treatment, trouble urinating because of pain,new or increased discharge from the vagina, rash or joint pain, Increased back or abdominal pain.  4. You will be notified of you wet prep and Chlamydia and Gonorrhea test results when they become available.

## 2021-06-09 NOTE — TELEPHONE ENCOUNTER
Call from pt       She is at her cabin and wondering if provider would call in Rx for ABX without being seen for this       Painful urination   Yes is able to start a stream - painful    No blood - has been seen in UA's in the past    No back pain but some lower ABD discomfort     No discharge     No urine odor     Urine not cloudy     Not pregnant     Not concerned about possible STD     She thinks may be her 4th or 5th UTI this past year         Could use [facility] in [facility]      Pt tele# [phone]        Encouraged fluid intake - I will message provider regarding this request       Brian Estes rn                   Reason for Disposition    Severe pain with urination    Additional Information    Negative: Shock suspected (e.g., cold/pale/clammy skin, too weak to stand, low BP, rapid pulse)    Negative: Sounds like a life-threatening emergency to the triager    Negative: Unable to urinate (or only a few drops) and bladder feels very full    Negative: Vomiting    Negative: Patient sounds very sick or weak to the triager    Protocols used: URINATION PAIN - FEMALE-A-OH

## 2021-06-09 NOTE — TELEPHONE ENCOUNTER
[facility]rim DS [facility] in [place]. Contacted pt and let her know.

## 2021-06-10 NOTE — PROGRESS NOTES
Patient would like to be contacted via the following phone number  [phone]     ASSESSMENT:  1. Recurrent UTI  Triggered by sexual intercourse.  Since beginning of the year she had at least 4 infections.  Only twice a urine culture grew group B strep.  Previously she also has had urinalysis that is very active with his white and red cells and negative urine culture.  Most recently on June 27 UA looked infected but urine culture did not grow anything.  Keflex has helped resolve her symptoms but only a day after finishing his diarrhea occurred.  Currently she has classic symptoms of cystitis.  Of note she was screened for STDs in July and it was negative.  A1c in January was normal.  Discussed that she might have bacteria resistant to Keflex versus nonbacterial cystitis.  I urged her to schedule appointment with urology and have an ultrasound done to look for any structural abnormalities due to recurrent symptoms.  She will submit another urine test prior to starting wider spectrum antibiotic.  - US Kidney Bilateral; Future  - cefpodoxime (VANTIN) 200 MG tablet; Take 1 tablet (200 mg total) by mouth 2 (two) times a day for 10 days.  Dispense: 20 tablet; Refill: 0  - Ambulatory referral to Urology    2. Acute cystitis with hematuria  As above  - US Kidney Bilateral; Future  - cefpodoxime (VANTIN) 200 MG tablet; Take 1 tablet (200 mg total) by mouth 2 (two) times a day for 10 days.  Dispense: 20 tablet; Refill: 0  - Ambulatory referral to Urology      CHIEF COMPLAINT:  Chief Complaint   Patient presents with     Dysuria     has pain, frequency. just finished abx yesterday from previous UTI. states she has had 6 of them this Summer       HISTORY OF PRESENT ILLNESS:  Maria Fernanda is a 24 y.o. female contacting the clinic today via phone for recurrent urinary symptoms.    Patient is otherwise healthy young female and has not had frequent urinary symptoms until this year.  She developed initial urinary tract infection in  February of this year and urine culture grew group B strep.  Subsequently on June 6 urinalysis showed red blood cells, white blood cells and leukocyte esterase and urine culture was negative for bacteria.  She was treated with antibiotic.  Right after that on June 18 she was in the emergency room again with recurrent UTI and once again urinalysis was very active with red and white cells and bacteriuria and urine culture grew group B strep.  At that time wet prep was negative and STD screening was negative.  She was treated with Bactrim for 7 days.  Most recently on July 27 she went to walk-in clinic and urinalysis once again showed bladder Peerless esterase and she was started on Keflex.  Interestingly urine culture did not grow anything.  She reports that her symptoms actually improved on Keflex but 24 hours after she completed the antibiotic symptoms recurred.    Currently she is experiencing mild lower abdominal discomfort, dysuria, urgency and frequency and problem emptying her bladder.  Her urine today was pink.  She has 1 sexual partner and declines STD screening today.  She is trying to drink a lot of fluids.  She denies any family history of kidney stones.  Of note she had normal physical in January of this year and A1c was normal.    REVIEW OF SYSTEMS:    All other systems are negative.    PFSH:  Social History     Social History Narrative    Mom- Ricarda, dad- Randy     brother - James       TOBACCO USE:  Social History     Tobacco Use   Smoking Status Never Smoker   Smokeless Tobacco Never Used       VITALS:  There were no vitals filed for this visit.  Wt Readings from Last 3 Encounters:   06/28/20 158 lb 8 oz (71.9 kg)   06/06/20 162 lb 6 oz (73.7 kg)   02/21/20 169 lb 12.8 oz (77 kg)       PHYSICAL EXAM:  (observations via Phone)  Pleasant young female, no acute distress.  Awake alert and oriented over the phone.  No respiratory symptoms noted.    ADDITIONAL HISTORY SUMMARIZED (2): Chart reviewed  CARE  "EVERYWHERE/ EXTRA INFORMATION (1):   No orders of the defined types were placed in this encounter.    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    MEDICATIONS: Reviewed and updated per CA and MD  Current Outpatient Medications   Medication Sig Dispense Refill     cephalexin (KEFLEX) 500 MG capsule Take 1 capsule (500 mg total) by mouth 2 (two) times a day for 7 days. 14 capsule 0     No current facility-administered medications for this visit.        The patient has been notified of following:     \"This telephone visit will be conducted via a call between you and your physician/provider. We have found that certain health care needs can be provided without the need for a physical exam.  This service lets us provide the care you need with a short phone conversation.  If a prescription is necessary we can send it directly to your pharmacy.  If lab work is needed we can place an order for that and you can then stop by our lab to have the test done at a later time.    Telephone visits are billed at different rates depending on your insurance coverage. During this emergency period, for some insurers they may be billed the same as an in-person visit.  Please reach out to your insurance provider with any questions.    If during the course of the call the physician/provider feels a telephone visit is not appropriate, you will not be charged for this service.\"    Patient has given verbal consent to a Telephone visit? Yes    Patient " would like to receive their AVS by AVS Preference: Marie.    Angelic Acosta, CMA

## 2021-06-10 NOTE — TELEPHONE ENCOUNTER
Medication Question or Clarification  Who is calling: Patient   What medication are you calling about (include dose and sig)?: cefpodoxime proxetil    Who prescribed the medication?: Angelia Li  What is your question/concern?: Pt needs an alternative medication, she does not want to wait for a PA.  Please advise.  Requested Pharmacy: [facility]  Okay to leave a detailed message?: No

## 2021-06-10 NOTE — PROGRESS NOTES
"  Assessment & Plan:       1. Recurrent cystitis  Urine,Microscopic    cefdinir (OMNICEF) 300 MG capsule      Medical Decision Making  Patient presents with dysuria for 2 to 3 days with history of recurrent UTIs.  Her urine analysis does show signs of a urinary tract infection.  No signs of pyelonephritis given no CVA tenderness no fevers.  Did review results of patient's kidney and bladder ultrasound.  Recommend patient continue doing plenty of clear fluids and use probiotics.  She will continue to follow-up with her PCP on 8/18 for further screening.  Did meet with the specialty  today to see why the patient has not had her urology appointment scheduled.  We reviewed the order and it appears that referral was sent to [facility] 2 weeks ago.  Provided patient with [facility] phone number.  Discussed signs of worsening symptoms and when to follow-up with PCP if no symptom improvement.      Patient Instructions   Analysis of your urine showed signs of a urinary tract infection.     Take the prescribed antibiotics for the entire course, even if symptoms improve.  You should expect improvement to begin in 24 hours. In the meantime, continue to drink plenty of fluids.  Recommend daily use of a probiotic while taking prescribed medication (a common brand is Culturelle, yogurt with \"active cultures\" are also appropriate).    Reasons to come back for re-evaluation:  - Develop a fever, back or flank pain, or nausea and vomiting  - If symptoms have not completely improved after 72 hours  - Recurrent symptoms within a few weeks after treatment has concluded          Subjective:       Maria Fernanda Payton is a 24 y.o. female here for evaluation of acute onset dysuria.  Onset of symptoms was 2 to 3 days ago.  Associated symptoms include hematuria, low back pains, urinary urgency, and increased urinary frequency.  Patient has been having multiple and frequent urinary tract infections this year due to unknown cause.  She " has referral in place to see urology, but has not heard back from them to schedule an appointment yet.  Patient otherwise denies fevers, vomiting, abdominal pains, vaginal discharge, rashes, lesions.  She was last evaluated on 8/3 and had a normal urine analysis despite having similar dysuria symptoms.  She did not take any antibiotics at that time and her symptoms improved spontaneously.  Patient did have her last menstrual cycle start on 8/6 and lasted 7 days.  She did note on the last day, following intercourse, that she did have increased vaginal bleeding.  This vaginal bleeding was not painful.  She has not noted any vaginal bleeding since 3 days ago.  She has a scheduled appointment with her primary care provider on 8/18 to do further STD screening and a Pap smear.  Patient did have an ultrasound of the kidneys and bladder performed on 8/3, but has not discussed the results.    The following portions of the patient's history were reviewed and updated as appropriate: allergies, current medications and problem list.    Review of Systems  Pertinent items are noted in HPI.     Allergies  Allergies   Allergen Reactions     Amoxicillin        Family History   Problem Relation Age of Onset     Hypothyroidism Mother         and multiple maternal family members with hypothyroidism     Hypertension Unknown      Hyperlipidemia Unknown      Diabetes Unknown        Social History     Socioeconomic History     Marital status: Single     Spouse name: None     Number of children: None     Years of education: None     Highest education level: None   Occupational History     None   Social Needs     Financial resource strain: None     Food insecurity     Worry: None     Inability: None     Transportation needs     Medical: None     Non-medical: None   Tobacco Use     Smoking status: Never Smoker     Smokeless tobacco: Never Used   Substance and Sexual Activity     Alcohol use: None     Drug use: None     Sexual activity: Yes      Partners: Male     Birth control/protection: OCP   Lifestyle     Physical activity     Days per week: None     Minutes per session: None     Stress: None   Relationships     Social connections     Talks on phone: None     Gets together: None     Attends [employer]alian service: None     Active member of club or organization: None     Attends meetings of clubs or organizations: None     Relationship status: None     Intimate partner violence     Fear of current or ex partner: None     Emotionally abused: None     Physically abused: None     Forced sexual activity: None   Other Topics Concern     None   Social History Narrative    Mom- Ricarda, dad- Randy     brother - James         Objective:       BP (!) 133/95 (Patient Site: Right Arm, Patient Position: Sitting, Cuff Size: Adult Regular)   Pulse 97   Temp 98.1  F (36.7  C) (Oral)   Resp 18   SpO2 100%   General appearance: Occasionally tearful, alert, appears stated age, cooperative, no distress and non-toxic  Back: No CVA tenderness  Abdomen: Tenderness over the suprapubic region, otherwise abdomen soft, normal bowel sounds, no masses organomegaly  Skin: Skin color, texture, turgor normal. No rashes or lesions     Lab Results    Recent Results (from the past 24 hour(s))   Urine,Microscopic   Result Value Ref Range    Bacteria, UA Few (!) None Seen hpf    RBC, UA  (!) None Seen, 0-2 hpf    WBC, UA >100 (!) None Seen, 0-5 hpf    Squam Epithel, UA 0-5 None Seen, 0-5 lpf     I personally reviewed these results and discussed findings with the patient.    Imaging    Us Kidney Bilateral    Result Date: 8/12/2020  EXAM: US KIDNEY BILATERAL WITH BLADDER LOCATION: [facility] DATE/TIME: 8/12/2020 2:17 PM INDICATION: Urinary tract infection, site not specified COMPARISON: None. TECHNIQUE: Routine Bilateral Renal and Bladder Ultrasound. FINDINGS: RIGHT KIDNEY: 11.5 cm. Normal without hydronephrosis or masses. LEFT KIDNEY: 11.6 cm. Normal without hydronephrosis or  masses. BLADDER: Normal.     1.  Normal kidney ultrasound.    Discussed findings with the patient.

## 2021-06-10 NOTE — TELEPHONE ENCOUNTER
It may take 2-3 days to notice improvement. Continue Keflex. Urine culture is pending- this will show us if she needs to be on a different antibiotic.    Jeniffer Clay PA-C

## 2021-06-10 NOTE — TELEPHONE ENCOUNTER
It looks like her urine yesterday was clear and Dr. Li had told her via [employer] to hold off on antibiotic.  I do not think she needs an alternate at this time, , please advise.

## 2021-06-10 NOTE — TELEPHONE ENCOUNTER
Question following Office Visit  When did you see your provider: Yesterday  What is your question: Patient was diagnosed with a UTI and taking Keflex 500 mg.  She has take 3 tablets so far, 2 yesterday and 1 today and is not feeling any relief of symptoms. She is taking Azo and one dose of Advil yesterday.   She is wondering if she needs different antibiotic.   Okay to leave a detailed message: Yes

## 2021-06-10 NOTE — TELEPHONE ENCOUNTER
UTI symptoms, again.    Patient states she has had multiple UTI infections  Over this summer.    She was recently issued a medication over the phone without   Leaving a urine sample.    She is now calling , and stating her symptoms are back, and much more painful.  She finished her last pill of 3 pills RX on Thursday.  She has been without meds for 5 days now.    Patient was advised that she needs to be seen, and she has chosen to go to the [facility].    Angelic Keith RN  [facility] Triage/refill nurse        Reason for Disposition    > 2 UTIs in last year    Additional Information    Negative: Shock suspected (e.g., cold/pale/clammy skin, too weak to stand, low BP, rapid pulse)    Negative: Sounds like a life-threatening emergency to the triager    Negative: Unable to urinate (or only a few drops) and bladder feels very full    Negative: Vomiting    Negative: Patient sounds very sick or weak to the triager    Negative: Severe pain with urination    Negative: Fever > 100.5 F (38.1 C)    Negative: Side (flank) or lower back pain present    Negative: Unusual vaginal discharge    Negative: Taking antibiotic > 3 days for UTI and painful urination not improved    Negative: Taking antibiotic > 24 hours for UTI and fever persists    Protocols used: URINATION PAIN - FEMALE-A-OH

## 2021-06-10 NOTE — TELEPHONE ENCOUNTER
Referral Request  Type of referral: Urology  Who s requesting: Patient  Why the request: Chronic UTI's  Have you been seen for this request: Yes, at [facility]  Does patient have a preference on a group/provider? N0  Okay to leave a detailed message?  Yes

## 2021-06-10 NOTE — PROGRESS NOTES
"[facility] Follow Up Note    Assessment/Plan:  1. Recurrent UTI  Maria Fernanda has had 4 UTIs since the first of the year.  She has had 2 sexual partners.  Sometimes, she has not used contraception.  \"I have not made the best choices recently \".  It seems that some of her UTIs are occurring post \"colitis.  Of note, she had a normal Pap smear in January.  3-4 documented UTIs. Have reviewed normal anatomy with her.  Ultrasound of the kidneys and bladder are normal.  Gynecologic exam is normal.  Recommendation    We will rule out STD and other causes of dysuria.  Have discussed the importance of voiding after intercourse.  Have recommended condom usage.  Have recommended a probiotic after she discontinues her current medications.  Also, recommend post coital antibiotic usage.  She will take 1 single strength Bactrim after intercourse.  She will push fluids and empty bladder immediately thereafter as well.  We will also have a gynecologic assessment.    - Chlamydia trachomatis & Neisseria gonorrhoeae, Amplified Detection  - Ambulatory referral to Obstetrics / Gynecology  - Urinalysis-UC if Indicated; Future  - sulfamethoxazole-trimethoprim (SEPTRA) 400-80 mg per tablet; Take one tablet after intercourse  Dispense: 20 tablet; Refill: 1  - Pregnancy (Beta-hCG, Qual), Urine  - Wet Prep, Vaginal  - Basic Metabolic Panel    2. General counseling for discussion of contraceptives  Patient interested in IUD.  Have discussed hormone and nonhormonal types.  Will check pregnancy test and refer to gynecology  - Pregnancy (Beta-hCG, Qual), Urine    3. Diarrhea, unspecified type  Recent diarrhea-likely antibiotic induced.  Recommend probiotics.  Will check for C. difficile.  Push fluids.  - C. difficile Toxigenic by PCR    4. Encounter for IUD insertion  As above  - Ambulatory referral to Obstetrics / Gynecology  - Pregnancy (Beta-hCG, Qual), Urine    5. Encounter for preventive care    - HIV Antigen/Antibody Screening " [facility]  - Syphilis Screen, Stefania Payne MD    Chief Complaint:  Chief Complaint   Patient presents with     Follow-up     chronic UTI's, check for STD, rule out pregnancy       History of Present Illness:  Maria Fernanda is a 24 y.o. female who has seen multiple providers over the past year for recurrent UTIs.  According to some of the previous notes, it seems that these events have been tied to sexual intercourse.  She reports to partners this past year.  On occasion, she does not use any form of contraception.  She does not keep condoms handy.  She is aware that this is reckless behavior.  She vows to do better.  She is interested in an IUD.    She is uncertain as to whether these infection do indeed occur following intercourse.  However as above, she has been more active recently.  She has had 4 such documented infections and is currently on antibiotics for the same.  The most recent culture grew group positive strep.  Of note also, she has requested STD test on multiple occasions.  She was tested in January, June and requests a test today.  These tests will be complete.    She denies any vaginal discharge or pelvic pain.  She has had a renal ultrasound that shows normal anatomy.  This was reviewed with her today.  She denies any constitutional symptoms of pelvic pain, discharge, fever, chills or hematuria.    Review of Systems:  A comprehensive review of systems was performed and was otherwise negative    PFSH:  Social History: She just graduated college.  She is single.  Social History     Tobacco Use   Smoking Status Never Smoker   Smokeless Tobacco Never Used       Past History: No past medical history on file.    Current Outpatient Medications   Medication Sig Dispense Refill     cefdinir (OMNICEF) 300 MG capsule Take 1 capsule (300 mg total) by mouth 2 (two) times a day for 10 days. 20 capsule 0     sulfamethoxazole-trimethoprim (SEPTRA) 400-80 mg per tablet Take one tablet after intercourse  "20 tablet 1     No current facility-administered medications for this visit.        Family History:     Physical Exam:  General Appearance:   She appears at baseline and in no acute distress.  She has mild tachycardia today  Vitals:    08/18/20 1421   BP: 128/74   Patient Site: Right Arm   Patient Position: Sitting   Cuff Size: Adult Regular   Pulse: (!) 110   SpO2: 98%   Weight: 158 lb (71.7 kg)   Height: 5' 4.5\" (1.638 m)     Wt Readings from Last 3 Encounters:   08/18/20 158 lb (71.7 kg)   06/28/20 158 lb 8 oz (71.9 kg)   06/06/20 162 lb 6 oz (73.7 kg)     Body mass index is 26.7 kg/m .    Pelvic exam is performed.  External genitalia within normal limits  Speculum is inserted.  Cervix appears healthy.  There is no vaginal discharge noted.  Wet prep and Gen-Probe were acquired.    Data Review:    Analysis and Summary of Old Records (2): Reviewed previous notes    Records Requested (1):       Other History Summarized (from other people in the room) (2):     Radiology Tests Summarized (XRAY/CT/MRI/DXA) (1):     Labs Reviewed (1): Reviewed previous labs    Medicine Tests Reviewed (EKG/ECHO/COLONOSCOPY/EGD) (1):     Independent Review of EKG or X-RAY (2):         "

## 2021-06-10 NOTE — PROGRESS NOTES
[facility] - VISIT NOTE    ASSESSMENT/PLAN  1. Recurrent UTI (urinary tract infection)  2. Dysuria  Recurrent, happening after sexual activity.  UA today shows moderate blood and large leukocytes, but nitrite negative which is consistent with previous UAs.  Culture/sensitivities pending.  Given this is her fifth time being treated for UTI this year, I think she should be sent to urology for possible anatomic evaluation.  Did discuss proper hygiene and handout provided.  Her previous cultures have been sensitive to Bactrim, however she just completed a course of this 4 days ago and only got minimal improvement so did elect to trial Keflex for 7 days.  Previous culture shows sensitivity to this.  - Ambulatory referral to Urology  - cephalexin (KEFLEX) 500 MG capsule; Take 1 capsule (500 mg total) by mouth 2 (two) times a day for 7 days.  Dispense: 14 capsule; Refill: 0  - Urinalysis-UC if Indicated  - Culture, Urine      Options for treatment and follow-up care were reviewed with the patient and/or guardian. Discussed symptoms in which to return to clinic sooner or go to the ER for evaluation. Maria Fernanda CHIN Tata and/or guardian engaged in the decision making process and verbalized understanding of the options discussed and agreed with the final plan.    Nate Steele MD     HPI    Maria Fernanda Payton is a 24 y.o. female brought in by self presenting for evaluation of urinary complaints:    Symptoms started a week ago  Burning  Frequency  Urgency   No blood    She was called in Abx last week for 3 days  Symptoms did improve   Now seem to be getting worse    Was tested and negative   No vaginal discharge or odor  No vaginal bleeding      ROS  Complete ROS negative except as noted in HPI.    Social History     Tobacco Use     Smoking status: Never Smoker     Smokeless tobacco: Never Used   Substance Use Topics     Alcohol use: Not on file     Drug use: Not on file       No past medical history on file.  No past  surgical history on file.      OBJECTIVE  Vitals:    07/27/20 1255   BP: 122/88   Pulse: 79   Resp: 12   Temp: 98  F (36.7  C)   TempSrc: Oral   SpO2: 98%        Physical Exam  General: No acute distress  Eyes:  normal conjunctiva, normal sclera   Ears:  external ears normal  Nose:  no rhinorrhea  Neck: good ROM, supple  CV: Extremities well perfused  Resp: Talking in complete sentences, no respiratory distress  Neuro: moves all 4 extremities, no focal deficits noted  Extrem: no edema, no cyanosis, well-perfused    Labs    Results for orders placed or performed in visit on 07/27/20   Urinalysis-UC if Indicated   Result Value Ref Range    Color, UA Yellow Colorless, Yellow, Straw, Light Yellow    Clarity, UA Cloudy (!) Clear    Glucose, UA Negative Negative    Bilirubin, UA Negative Negative    Ketones, UA Negative Negative    Specific Gravity, UA >=1.030 1.005 - 1.030    Blood, UA Moderate (!) Negative    pH, UA 5.5 5.0 - 8.0    Protein,  mg/dL (!) Negative mg/dL    Urobilinogen, UA 0.2 E.U./dL 0.2 E.U./dL, 1.0 E.U./dL    Nitrite, UA Negative Negative    Leukocytes, UA Large (!) Negative    Bacteria, UA      RBC, UA      WBC, UA      Squam Epithel, UA

## 2021-06-10 NOTE — TELEPHONE ENCOUNTER
Agree.  She should just push fluids and monitor symptoms.  If still with dysuria and a normal UA, needs to be evaluated for  Gynecologic  Causes.   complains of pain/discomfort/sore throat

## 2021-06-10 NOTE — TELEPHONE ENCOUNTER
Called pt and also lvm to call radiology to schedule US and also that I put in [facility] portal and they will contact pt.  I also faxed order/notes to [facility]

## 2021-06-10 NOTE — TELEPHONE ENCOUNTER
Pt will be coming in for UA/UCx today , I put order in , please put her on the lab schedule if needed.

## 2021-06-10 NOTE — TELEPHONE ENCOUNTER
Patient calls today about unresolved UTI symptoms after finishing 7 day course of antibiotics. The patient was seen in office 07/27/20 for recurrent UTI. She has UA and UC done at the time and was given 7 day course of keflex. Patient finished the keflex but has not seen improvement in her symptoms. She still has pressure and pain with urination and pain over the bladder. The patient states she's had 4-5 UTIs just this year.     Since patient is still having symptoms after completing the antibiotics, I recommended that she is seen again. The patient states she is working this evening and cannot be seen in office but can do telephone visit. I will help patient set up telephone visit. Patient states if any labs are ordered during this visit then she could come in tomorrow to do those. Telephone visit today at 340pm with Dr. Li.    Carlotta Correa RN       Reason for Disposition    [1] Taking antibiotic > 72 hours (3 days) for UTI AND [2] painful urination or frequency not improved    Additional Information    Negative: Shock suspected (e.g., cold/pale/clammy skin, too weak to stand, low BP, rapid pulse)    Negative: Sounds like a life-threatening emergency to the triager    Negative: Urinary tract infection suspected, but not taking antibiotics    Negative: [1] Unable to urinate (or only a few drops) > 4 hours AND     [2] bladder feels very full (e.g., palpable bladder or strong urge to urinate)    Negative: Passing pure blood or large blood clots (i.e., size > a dime)  (Exceptions: flecks, small strands, or pinkish-red color)    Negative: Patient sounds very sick or weak to the triager    Negative: [1] SEVERE pain (e.g., excruciating) AND [2] no improvement 2 hours after pain medications    Negative: [1] Fever > 100.0 F (37.8 C) AND [2] new onset since starting antibiotics    Negative: [1] Side (flank) or lower back pain AND [2] new onset since starting antibiotics    Negative: [1] Taking antibiotic > 24 hours  for UTI AND [2] flank or lower back pain worsening    Negative: [1] Vomiting 2 or more times AND [2] interferes with taking oral antibiotic    Negative: [1] Taking antibiotic > 24 hours for UTI (urinary tract or bladder infection) AND [2] fever persists    Protocols used: URINARY TRACT INFECTION ON ANTIBIOTIC FOLLOW-UP CALL - FEMALE-A-

## 2021-06-15 NOTE — PROGRESS NOTES
"[facility] Follow Up Note    Assessment/Plan:  1. Generalized anxiety disorder  With \"out of body\" moments.  Doing better since last visit.  She did not start the propanolol for tachycardia .  All tests reviewed today.  Recommendation: Discussed a long-term strategy for treatment of generalized anxiety disorder.  Would like to begin Zoloft at 25 mg per day for 4-5 days, increasing to 50.  Follow-up in 4-6 weeks.  Additionally, have discussed the addition of a psychotherapist.  She is going to take this into consideration.    2. Headache, migraine  Have recommended journaling headache occurrences.  She should pay close attention with respect to hormones, weather, foods such as alcohol, or and cheeses, sleep patterns etc.  Therapeutic recommendation: Imitrex for migraine headaches.  Occasional use of nonsteroidals for tension headaches.      Follow-up at next visit    Angela Payne MD    Chief Complaint:  Chief Complaint   Patient presents with     Follow-up       History of Present Illness:  Maria Fernanda is a 22 y.o. female who is here today accompanied by her mom for follow-up of the constellation of symptoms that are likely secondary to generalized anxiety.  Of note, she has had moments of panic and \"out of head \"experiences.  She has had tachycardia and general social discomfort.  These were discussed at depth at last visit.  At her last visit, a PHQ 9 was approximately 14 with generalized anxiety score elevated as well.  Diagnostics were performed to rule out any metabolic causes of her issues.    Since last visit, she continues with regular headache.  She does state that she has more energy and feels more motivated to begin exercise.  She has returned to school but has not returned to work.  She did have some loose stools that are normalizing.    Review of Systems:  A comprehensive review of systems was performed and was otherwise negative    PFSH:  Social History: She is a college student but is " currently living at home.  She attends [employer]  History   Smoking Status     Never Smoker   Smokeless Tobacco     Never Used       Past History: Unremarkable  Current Outpatient Prescriptions   Medication Sig Dispense Refill     drospirenone-ethinyl estradiol (ROLANDO) 3-0.03 mg per tablet TAKE 1 TABLET BY MOUTH DAILY  12     propranolol (INDERAL) 10 MG tablet Take 1 tablet (10 mg total) by mouth 2 (two) times a day. 20 tablet 0     sertraline (ZOLOFT) 50 MG tablet Take 1 tablet (50 mg total) by mouth daily. Begin with a half tab daily, after 4 days, if tolerating well, may increase to one tab. 30 tablet 2     SUMAtriptan (IMITREX) 50 MG tablet Take 1 tablet (50 mg total) by mouth once as needed for migraine. 9 tablet 2     valACYclovir (VALTREX) 500 MG tablet Take 1 tablet (500 mg total) by mouth 2 (two) times a day. 6 tablet 3     No current facility-administered medications for this visit.        Family History: Significant for generalized anxiety disorder    Physical Exam:  General Appearance:   He is pleasant and well-appearing and in no acute distress  Vitals:    01/24/18 1139   BP: 110/72   Patient Site: Left Arm   Patient Position: Sitting   Cuff Size: Adult Regular   Pulse: 80   Weight: 126 lb (57.2 kg)     Wt Readings from Last 3 Encounters:   01/24/18 126 lb (57.2 kg)   01/16/18 123 lb 11.2 oz (56.1 kg)   06/01/16 137 lb 3.2 oz (62.2 kg) (66 %, Z= 0.40)*     * Growth percentiles are based on CDC 2-20 Years data.     Body mass index is 21.29 kg/(m^2).        Data Review:    Analysis and Summary of Old Records (2): 0      Records Requested (1): 0      Other History Summarized (from other people in the room) (2): Her mother contributes to the history    Radiology Tests Summarized (XRAY/CT/MRI/DXA) (1): The MRI    Labs Reviewed (1): Viewed the labs    Medicine Tests Reviewed (EKG/ECHO/COLONOSCOPY/EGD) (1): 0    Independent Review of EKG or X-RAY (2): 0

## 2021-06-15 NOTE — PROGRESS NOTES
"Assessment and Plan: New patient consult     1. Palpitations/pupil dilation/\"out of head \"experiences  Unusual constellation of symptoms that started approximately 10 days ago.  Not preceded by any respiratory tract infection or virus.  Seems similar in nature to when she used LSD ×1 and mushrooms in the past.  Of note, she has not used any drugs or illicit substances in the past 6 months.  Family history of anxiety.  Remote history of likely concussive head injury when drunk.  Exam consistent with dilated pupils and tachycardia.  Anterior cervical neck mass on the right.  Recommendation: We will proceed with a metabolic workup to include thyroid functions general chemistries as below.  Would also like to see an ultrasound of the neck.  And MRI of brain due to headache and out of head experiences.  Therapeutic treatment: No specific medications for now.  We will give low-dose Inderal to manage tachycardia and sensation of palpitations until laboratory studies every turned.  Would like to see her for follow-up in the next few days  - Thyroid Stimulating Hormone (TSH); Future  - T4, Free  - T3 (Triiodthyronine), Free  - Comprehensive Metabolic Panel  - HM2(CBC w/o Differential)  - Sedimentation Rate      2. Lymphadenopathy of right cervical region  As above  - US Neck Limited; Future    3. New onset of headaches  10 day history of headaches graded 5/10.  Located in the frontal lobe.  - MR Brain Without Contrast; Future      Follow-up when tests are complete    Angela Payne MD  1/16/2018    Chief Complaint:  Chief Complaint   Patient presents with     [facility]     Anxiety     Headache       History of Present Illness:  Maria Fernanda is a 22 y.o. female who is here today accompanied by her mother for evaluation of the following symptoms.  Of note, she was in her usual state of health around the holidays.  She reports that approximately 10 days ago, while on the job as a , she began to experience a rapid " "heart rate and sensations that she was \"out of my head \".  She states that she felt very unusual and very unsettled as a result.  Since that time she has had intermittent episodes of palpitations and tachycardia, she notes her pupils dilate, she also notes decreased appetite with about a 10 pound weight loss, low-grade nausea, soft stools and a general lack of well-being.  She states that she has had intermittent random moments of feeling disconnected from reality.  She denies suicidal ideation or hopelessness.  She denies any stress in her relationships at home.  She states that she has a significant other who treats her well.  She gets along with her parents and is living in her family's home.  She attended [employer].  She states her grades have been good.  She has not had any difficulty concentrating.  She does describe some drug use over the past year and a half.  She had one episode of alcohol intoxication to the point that she blacked out and hit her head on 2 or 3 occasions.  This is very worrisome for her.  She states she used LSD on one occasion.  She has not used it since.  She has also used mushrooms approximately 6 months ago.  She describes marijuana use as well.  Alcohol use is 1-2 times weekly.    Additional symptoms include dry mouth and decreased mood.  She describes changes in vision and lightheadedness.  As above, she has noted some symptoms of anxiety and panic.    With regard to menstrual history, her last period was January 7.  She was post started new birth control pill.  She has held off on this.  Of note, different birth control pepper preparations have left her head feeling unsettled as well.    Her family history is significant for generalized anxiety in mother, uncle, brother, etc.    Review of Systems:    The rest of the review of systems are negative for all systems.    PFSH:  Social History: She is single.  She lives in her parents home.  She is a college " "student and works as a  currently  History   Smoking Status     Never Smoker   Smokeless Tobacco     Never Used       Past Medical History: In general she has been healthy  Allergies   Allergen Reactions     Amoxicillin        Family History: Significant for anxiety and thyroid dysfunction  Family History   Problem Relation Age of Onset     Hypothyroidism Mother      and multiple maternal family members with hypothyroidism     Hypertension       Hyperlipidemia       Diabetes         Physical Exam:  Vitals:    01/16/18 0939   BP: 128/82   Patient Site: Left Arm   Patient Position: Sitting   Cuff Size: Adult Regular   Pulse: (!) 102   Weight: 123 lb 11.2 oz (56.1 kg)   Height: 5' 4.5\" (1.638 m)     Wt Readings from Last 3 Encounters:   01/16/18 123 lb 11.2 oz (56.1 kg)   06/01/16 137 lb 3.2 oz (62.2 kg) (66 %, Z= 0.40)*   02/09/16 132 lb 8 oz (60.1 kg) (59 %, Z= 0.24)*     * Growth percentiles are based on [facility] 2-20 Years data.       General Appearance:  Alert, cooperative, no distress, appears stated age   Head:  Normocephalic, without obvious abnormality, atraumatic   Eyes:  PERRL, pupils are dilated, conjunctiva/corneas clear, EOM's intact   Ears:  Normal TM's and external ear canals, both ears   Nose: Nares normal, septum midline,mucosa normal, no drainage    Throat: Lips, mucosa, and tongue normal; teeth and gums normal   Neck: Supple, symmetrical, trachea midline, there is a lower anterior cervical swelling on the right.   Back:   Symmetric, no curvature, ROM normal, no CVA tenderness   Lungs:   Clear to auscultation bilaterally, respirations unlabored   Heart:  Regular rate and tachycardic rhythm, S1 and S2 normal, no murmur, rub, or gallop   Abdomen:   Soft, non-tender, bowel sounds active all four quadrants,  no masses, no organomegaly, no hernias    Extremities: Extremities normal, atraumatic, no cyanosis or edema   Skin: Skin color, texture, turgor normal, no rashes or lesions   Lymph nodes: " Cervical, supraclavicular, and axillary nodes normal   Neurologic: Normal, CN 2-12 intact     Medications:  Current Outpatient Prescriptions   Medication Sig Dispense Refill     cefixime 400 mg cap Take 400 mg by mouth daily. 1 capsule 0     norethindrone-ethinyl estradiol (MICROGESTIN 1/20, 21,) 1-20 mg-mcg per tablet Take 1 tablet by mouth daily. As directed 84 tablet 3     propranolol (INDERAL) 10 MG tablet Take 1 tablet (10 mg total) by mouth 2 (two) times a day. 20 tablet 0     valACYclovir (VALTREX) 500 MG tablet Take 1 tablet (500 mg total) by mouth 2 (two) times a day. 6 tablet 3     No current facility-administered medications for this visit.        Immunizations:  Immunization History   Administered Date(s) Administered     DTaP, historic 1996, 01/21/1997, 03/13/1997, 12/19/1997, 01/17/2002     HPV Quadrivalent 09/14/2010, 12/10/2010, 06/30/2011     Hep A, historic 10/07/2008, 04/20/2009     Hep B, historic 1996, 01/21/1997, 06/17/1997     HiB, historic,unspecified 1996, 01/21/1997, 03/13/1997, 12/19/1997     IPV 1996, 01/21/1997, 03/13/1997, 01/17/2002     Influenza, Seasonal, Inj PF IIV3 10/14/2013     Influenza, inj, historic,unspecified 10/08/1998, 11/05/1998, 10/28/2002, 11/05/2003, 10/27/2006     MMR 09/23/1997, 01/17/2002     Meningococcal MCV4P 10/07/2008, 10/10/2012     Tdap 10/07/2008     Varicella 09/23/1997, 10/07/2008       Data Review:    Analysis and Summary of Old Records (2): Review of gynecology record    Records Requested (1): 0      Other History Summarized (from other people in the room) (2): Mother present    Radiology Tests Summarized (XRAY/CT/MRI/DXA) (1): Ordered    Labs Reviewed (1): Ordered    Medicine Tests Reviewed (EKG/ECHO/COLONOSCOPY/EGD) (1): 0    Independent Review of EKG or X-RAY (2): 0

## 2021-06-16 NOTE — PROGRESS NOTES
Assessment and Plan:     1. Encounter for preventive care  Up-to-date on immunizations-in fact getting coronavirus vaccine series.  Additionally, up-to-date on Pap smear which was done in January 2020.  STD screen for her age group completed in August.  She is exercising regularly.  Weight and vitals are stable.  Labs as below for evaluation for family history of hypertriglyceridemia, type 2 diabetes and thyroid disease.  - HM2(CBC w/o Differential)  - Thyroid Cascade  - Basic Metabolic Panel  - Lipid Cascade FASTING    2. Pressure sensation in right ear  Exam unremarkable-likely due to eustachian tube dysfunction.  Recommendation: Prednisone as below.  Begin Allegra daily for the next 2 to 3 weeks.  Think should resolve  - predniSONE (DELTASONE) 20 MG tablet; Take 40 mg by mouth daily.  Dispense: 10 tablet; Refill: 0    3. Enlarged lymph nodes  Small moment to walnut size axillary lymph node on the right.  Present since September.  Not related to the coronavirus vaccine.  Recommendation: We will check ultrasound.  Breast exam negative  - US Axilla Non Vascular Right; Future    4. Mood swings  81 secondary to low-dose birth control pills.  She states it started with the onset of her hormones.  She does have a gynecologist and will contact her    5. Seasonal allergic rhinitis due to other allergic trigger  Allegra as above    6. Recurrent UTI  Check urine analysis  - Urinalysis-UC if Indicated          Angela Payne MD  3/17/2021    Chief Complaint:  Chief Complaint   Patient presents with     Annual Exam     Mass     under right armpit     Allergies     seasonal; sneezing; nasal congestion; ear pressure x 1 week       History of Present Illness:  Maria Fernanda is a 25 y.o. female who is here today for an annual physical examination.  Of note, in general she enjoys good health.  About a year ago, she had recurrent UTIs-these have settled.  She is up-to-date on gynecologic care and has in fact establish with a  "gynecologist in [facility].    She has the following concerns.  First, she has a small lymph node that has been present under her armpit since September.  She does not believe that it is growing rapidly.  She does wonder if it is from an ingrown hair, etc.  She does use coconut oil in her armpits and does not use deodorant.  She does not have any breast pain.    Additionally, she has right ear pain.  She was seen at an urgent care.  She was diagnosed with allergies and eustachian tube dysfunction.  She has ongoing pressure.    Health maintenance is reviewed and updated in the chart.  She is up-to-date on dental care and gynecologic care.    Review of Systems:    The rest of the review of systems are negative for all systems.    PFSH:  Social History: She is single and a college graduate.  She delivers pizza currently.  She lives with her boyfriend in [facility] [place].  She has been exercising regularly and cooking.  Social History     Tobacco Use   Smoking Status Never Smoker   Smokeless Tobacco Never Used       Past Medical History:   Allergies   Allergen Reactions     Amoxicillin        Family History: There is thyroid disease, hypertriglyceridemia and type 2 diabetes in her family  Family History   Problem Relation Age of Onset     Hypothyroidism Mother         and multiple maternal family members with hypothyroidism     Hypertension Unknown      Hyperlipidemia Unknown      Diabetes Unknown        Physical Exam:  Vitals:    03/17/21 1143   BP: 100/70   Patient Site: Left Arm   Patient Position: Sitting   Cuff Size: Adult Regular   Pulse: 88   SpO2: 99%   Weight: 148 lb 3.2 oz (67.2 kg)   Height: 5' 5.5\" (1.664 m)     Wt Readings from Last 3 Encounters:   03/17/21 148 lb 3.2 oz (67.2 kg)   08/18/20 158 lb (71.7 kg)   06/28/20 158 lb 8 oz (71.9 kg)       General Appearance:  Alert, cooperative, no distress, appears stated age   Head:  Normocephalic, without obvious abnormality, atraumatic   Eyes:  PERRL, " conjunctiva/corneas clear, EOM's intact   Ears:  Normal TM's and external ear canals, both ears   Nose: Nares normal, septum midline,mucosa normal, no drainage        Neck: Supple, symmetrical, trachea midline, no adenopathy;  thyroid: not enlarged, symmetric, no tenderness/mass/nodules; no carotid bruit or JVD   Back:   Symmetric, no curvature, ROM normal, no CVA tenderness   Lungs:   Clear to auscultation bilaterally, respirations unlabored   Heart:  Regular rate and rhythm, S1 and S2 normal, no murmur, rub, or gallop   Abdomen:   Soft, non-tender, bowel sounds active all four quadrants,  no masses, no organomegaly, no hernias    Extremities: Extremities normal, atraumatic, no cyanosis or edema   Skin: Skin color, texture, turgor normal, no rashes or lesions   Lymph nodes: Cervical, supraclavicular, and axillary nodes normal   Neurologic: Normal, CN 2-12 intact                               A breast exam is performed.  Breasts are dense.  No masses, nipple discharge or adenopathy is noted.    Medications:  Current Outpatient Medications   Medication Sig Dispense Refill     SALINE NASAL 0.65 % nasal spray 1 SPRAY INTO EACH NOSTRIL AS NEEDED FOR RHINITIS.       KACEY 3-0.03 mg per tablet Take 1 tablet by mouth daily.       predniSONE (DELTASONE) 20 MG tablet Take 40 mg by mouth daily. 10 tablet 0     No current facility-administered medications for this visit.        Immunizations:  Immunization History   Administered Date(s) Administered     COVID-19,PF,Pfizer 03/12/2021     DTaP, historic 1996, 01/21/1997, 03/13/1997, 12/19/1997, 01/17/2002     HPV Quadrivalent 09/14/2010, 12/10/2010, 06/30/2011     Hep A, historic 10/07/2008, 04/20/2009     Hep B, historic 1996, 01/21/1997, 06/17/1997     HiB, historic,unspecified 1996, 01/21/1997, 03/13/1997, 12/19/1997     IPV 1996, 01/21/1997, 03/13/1997, 01/17/2002     Influenza, Seasonal, Inj PF IIV3 10/14/2013     Influenza, inj, historic,unspecified  10/08/1998, 11/05/1998, 10/28/2002, 11/05/2003, 10/27/2006     Influenza,seasonal,quad inj =/> 6months 10/17/2020     MMR 09/23/1997, 01/17/2002     Meningococcal MCV4P 10/07/2008, 10/10/2012     Tdap 10/07/2008, 01/03/2020     Varicella 09/23/1997, 10/07/2008

## 2021-06-17 NOTE — TELEPHONE ENCOUNTER
Telephone Encounter by Krystin Hopson CMA at 8/4/2020 12:20 PM     Author: Krystin Hopson CMA Service: -- Author Type: Certified Medical Assistant    Filed: 8/4/2020 12:21 PM Encounter Date: 8/3/2020 Status: Signed    : Krystin Hopson CMA (Certified Medical Assistant)       Patient Returning Call  Reason for call:  Call back  Information relayed to patient:  Writer relayed messgae to Pt:   Agree.  She should just push fluids and monitor symptoms.  If still with dysuria and a normal UA, needs to be evaluated for  Gynecologic  Causes.      Osman Epstein, PharmD           9:20 AM   Note      It looks like her urine yesterday was clear and Dr. Li had told her via [employer] to hold off on antibiotic.  I do not think she needs an alternate at this time, , please advise        Pt agrees, and understands  .  Patient has additional questions:  No  If YES, what are your questions/concerns:  N/A  Okay to leave a detailed message?: No call back needed

## 2021-06-18 NOTE — PROGRESS NOTES
ASSESSMENT AND PLAN:    1. Plantar wart   2 on the right foot, one on the left. All three were pared down, and treated with liquid nitrogen without complication.  She will use duofilm daily until resolved. We discussed that friction in exercise plays a role, will use sports stockings and foot powder to minimize friction.     Follow up prn.     CHIEF COMPLAINT:  Chief Complaint   Patient presents with     Warts     HISTORY OF PRESENT ILLNESS:  Maria Fernanda Payton is a 22 y.o. female with plantar warts both feet.  She exercises in shoes without stockings.      Active Ambulatory Problems     Diagnosis Date Noted     Allergies      HSV infection 06/01/2016     Resolved Ambulatory Problems     Diagnosis Date Noted     Genitourinary Candidiasis      Urinary Tract Infection      Chlamydial Infections      Abdominal Pain      No Additional Past Medical History     VITALS:  Vitals:    05/21/18 1615   BP: 102/64   Patient Site: Left Arm   Patient Position: Sitting   Cuff Size: Adult Regular   Pulse: 66   Weight: 132 lb (59.9 kg)     Wt Readings from Last 3 Encounters:   05/21/18 132 lb (59.9 kg)   01/24/18 126 lb (57.2 kg)   01/16/18 123 lb 11.2 oz (56.1 kg)       PHYSICAL EXAM:  Constitutional:   In NAD, alert and oriented  Extremities: small plantar warts both feet, no infection, or erythema    Current Outpatient Prescriptions   Medication Sig Dispense Refill     drospirenone-ethinyl estradiol (ROLANDO) 3-0.03 mg per tablet TAKE 1 TABLET BY MOUTH DAILY  12     sertraline (ZOLOFT) 50 MG tablet Take 1 tablet (50 mg total) by mouth daily. Begin with a half tab daily, after 4 days, if tolerating well, may increase to one tab. 30 tablet 2     SUMAtriptan (IMITREX) 50 MG tablet Take 1 tablet (50 mg total) by mouth once as needed for migraine. 9 tablet 2     salicylic acid-lactic acid 17 % external solution Apply to plantar warts daily 14 mL 0     No current facility-administered medications for this visit.      Sumeet Roman,  MD  Internal Medicine  [facility]

## 2021-06-18 NOTE — PATIENT INSTRUCTIONS - HE
"Patient Instructions by Nate Steele MD at 7/27/2020 12:50 PM     Author: Nate Steele MD Service: -- Author Type: Resident    Filed: 7/27/2020  1:25 PM Encounter Date: 7/27/2020 Status: Signed    : Nate Steele MD (Physician)       Patient Education     Bladder Infection, Female (Adult)    Urine is normally doesn't have any bacteria in it. But bacteria can get into the urinary tract from the skin around the rectum. Or they can travel in the blood from elsewhere in the body. Once they are in your urinary tract, they can cause infection in the urethra (urethritis), the bladder (cystitis), or the kidneys (pyelonephritis).  The most common place for an infection is in the bladder. This is called a bladder infection. This is one of the most common infections in women. Most bladder infections are easily treated. They are not serious unless the infection spreads to the kidney.  The phrases \"bladder infection,\" \"UTI,\" and \"cystitis\" are often used to describe the same thing. But they are not always the same. Cystitis is an inflammation of the bladder. The most common cause of cystitis is an infection.  Symptoms  The infection causes inflammation in the urethra and bladder. This causes many of the symptoms. The most common symptoms of a bladder infection are:    Pain or burning when urinating    Having to urinate more often than usual    Urgent need to urinate    Only a small amount of urine comes out    Blood in urine    Abdominal discomfort. This is usually in the lower abdomen above the pubic bone.    Cloudy urine    Strong- or bad-smelling urine    Unable to urinate (urinary retention)    Unable to hold urine in (urinary incontinence)    Fever    Loss of appetite    Confusion (in older adults)  Causes  Bladder infections are not contagious. You can't get one from someone else, from a toilet seat, or from sharing a bath.  The most common cause of bladder infections is bacteria from the bowels. The " bacteria get onto the skin around the opening of the urethra. From there, they can get into the urine and travel up to the bladder, causing inflammation and infection. This usually happens because of:    Wiping improperly after urinating. Always wipe from front to back.    Bowel incontinence    Pregnancy    Procedures such as having a catheter inserted    Older age    Not emptying your bladder. This can allow bacteria a chance to grow in your urine.    Dehydration    Constipation    Sex    Use of a diaphragm for birth control   Treatment  Bladder infections are diagnosed by a urine test. They are treated with antibiotics and usually clear up quickly without complications. Treatment helps prevent a more serious kidney infection.  Medicines  Medicines can help in the treatment of a bladder infection:    Take antibiotics until they are used up, even if you feel better. It is important to finish them to make sure the infection has cleared.    You can use acetaminophen or ibuprofen for pain, fever, or discomfort, unless another medicine was prescribed. If you have chronic liver or kidney disease, talk with your healthcare provider before using these medicines. Also talk with your provider if you've ever had a stomach ulcer or gastrointestinal bleeding, or are taking blood-thinner medicines.    If you are given phenazopydridine to reduce burning with urination, it will cause your urine to become a bright orange color. This can stain clothing.  Care and prevention  These self-care steps can help prevent future infections:    Drink plenty of fluids to prevent dehydration and flush out your bladder. Do this unless you must restrict fluids for other health reasons, or your doctor told you not to.    Proper cleaning after going to the bathroom is important. Wipe from front to back after using the toilet to prevent the spread of bacteria.    Urinate more often. Don't try to hold urine in for a long time.    Wear loose-fitting  clothes and cotton underwear. Avoid tight-fitting pants.    Improve your diet and prevent constipation. Eat more fresh fruit and vegetables, and fiber, and less junk and fatty foods.    Avoid sex until your symptoms are gone.    Avoid caffeine, alcohol, and spicy foods. These can irritate your bladder.    Urinate right after intercourse to flush out your bladder.    If you use birth control pills and have frequent bladder infections, discuss it with your doctor.  Follow-up care  Call your healthcare provider if all symptoms are not gone after 3 days of treatment. This is especially important if you have repeat infections.  If a culture was done, you will be told if your treatment needs to be changed. If directed, you can call to find out the results.  If X-rays were done, you will be told if the results will affect your treatment.  Call 911  Call 911 if any of the following occur:    Trouble breathing    Hard to wake up or confusion    Fainting or loss of consciousness    Rapid heart rate  When to seek medical advice  Call your healthcare provider right away if any of these occur:    Fever of 100.4 F (38.0 C) or higher, or as directed by your healthcare provider    Symptoms are not better by the third day of treatment    Back or belly (abdominal) pain that gets worse    Repeated vomiting, or unable to keep medicine down    Weakness or dizziness    Vaginal discharge    Pain, redness, or swelling in the outer vaginal area (labia)  Date Last Reviewed: 10/1/2016    8041-3481 [employer]. [address] [place] [address], [place] [address]. All rights reserved. This information is not intended as a substitute for professional medical care. Always follow your healthcare professional's instructions.

## 2021-06-18 NOTE — PATIENT INSTRUCTIONS - HE
"Patient Instructions by Bibi Brian MD at 6/6/2020  1:00 PM     Author: Bibi Brian MD Service: -- Author Type: Physician    Filed: 6/6/2020  1:25 PM Encounter Date: 6/6/2020 Status: Addendum    : Bibi Brian MD (Physician)    Related Notes: Original Note by Bibi Brian MD (Physician) filed at 6/6/2020  1:22 PM       1. Drink plenty of liquids  2. Urinate frequently - try not to hold your urine if you have to go  3. May alternate tylenol every 6 hours with ibuprofen every 6 hours as needed for fever or pain  4. If follow up is needed, call and schedule an appointment which may be either by phone, video or in person.   5. If your symptoms are getting worse after 48 hours of antibiotics or you have any questions, call the clinic number - it's answered 24/7  6.  If you are having trouble with waiting too long for an answer from calling our clinic line, you may want to try a virtual visit through [employer]      Patient Education     Bladder Infection, Female (Adult)    Urine is normally doesn't have any bacteria in it. But bacteria can get into the urinary tract from the skin around the rectum. Or they can travel in the blood from elsewhere in the body. Once they are in your urinary tract, they can cause infection in the urethra (urethritis), the bladder (cystitis), or the kidneys (pyelonephritis).  The most common place for an infection is in the bladder. This is called a bladder infection. This is one of the most common infections in women. Most bladder infections are easily treated. They are not serious unless the infection spreads to the kidney.  The phrases \"bladder infection,\" \"UTI,\" and \"cystitis\" are often used to describe the same thing. But they are not always the same. Cystitis is an inflammation of the bladder. The most common cause of cystitis is an infection.  Symptoms  The infection causes inflammation in the urethra and bladder. This causes many of the symptoms. The most " common symptoms of a bladder infection are:    Pain or burning when urinating    Having to urinate more often than usual    Urgent need to urinate    Only a small amount of urine comes out    Blood in urine    Abdominal discomfort. This is usually in the lower abdomen above the pubic bone.    Cloudy urine    Strong- or bad-smelling urine    Unable to urinate (urinary retention)    Unable to hold urine in (urinary incontinence)    Fever    Loss of appetite    Confusion (in older adults)  Causes  Bladder infections are not contagious. You can't get one from someone else, from a toilet seat, or from sharing a bath.  The most common cause of bladder infections is bacteria from the bowels. The bacteria get onto the skin around the opening of the urethra. From there, they can get into the urine and travel up to the bladder, causing inflammation and infection. This usually happens because of:    Wiping improperly after urinating. Always wipe from front to back.    Bowel incontinence    Pregnancy    Procedures such as having a catheter inserted    Older age    Not emptying your bladder. This can allow bacteria a chance to grow in your urine.    Dehydration    Constipation    Sex    Use of a diaphragm for birth control   Treatment  Bladder infections are diagnosed by a urine test. They are treated with antibiotics and usually clear up quickly without complications. Treatment helps prevent a more serious kidney infection.  Medicines  Medicines can help in the treatment of a bladder infection:    Take antibiotics until they are used up, even if you feel better. It is important to finish them to make sure the infection has cleared.    You can use acetaminophen or ibuprofen for pain, fever, or discomfort, unless another medicine was prescribed. If you have chronic liver or kidney disease, talk with your healthcare provider before using these medicines. Also talk with your provider if you've ever had a stomach ulcer or  gastrointestinal bleeding, or are taking blood-thinner medicines.    If you are given phenazopydridine to reduce burning with urination, it will cause your urine to become a bright orange color. This can stain clothing.  Care and prevention  These self-care steps can help prevent future infections:    Drink plenty of fluids to prevent dehydration and flush out your bladder. Do this unless you must restrict fluids for other health reasons, or your doctor told you not to.    Proper cleaning after going to the bathroom is important. Wipe from front to back after using the toilet to prevent the spread of bacteria.    Urinate more often. Don't try to hold urine in for a long time.    Wear loose-fitting clothes and cotton underwear. Avoid tight-fitting pants.    Improve your diet and prevent constipation. Eat more fresh fruit and vegetables, and fiber, and less junk and fatty foods.    Avoid sex until your symptoms are gone.    Avoid caffeine, alcohol, and spicy foods. These can irritate your bladder.    Urinate right after intercourse to flush out your bladder.    If you use birth control pills and have frequent bladder infections, discuss it with your doctor.  Follow-up care  Call your healthcare provider if all symptoms are not gone after 3 days of treatment. This is especially important if you have repeat infections.  If a culture was done, you will be told if your treatment needs to be changed. If directed, you can call to find out the results.  If X-rays were done, you will be told if the results will affect your treatment.  Call 911  Call 911 if any of the following occur:    Trouble breathing    Hard to wake up or confusion    Fainting or loss of consciousness    Rapid heart rate  When to seek medical advice  Call your healthcare provider right away if any of these occur:    Fever of 100.4 F (38.0 C) or higher, or as directed by your healthcare provider    Symptoms are not better by the third day of  treatment    Back or belly (abdominal) pain that gets worse    Repeated vomiting, or unable to keep medicine down    Weakness or dizziness    Vaginal discharge    Pain, redness, or swelling in the outer vaginal area (labia)  Date Last Reviewed: 10/1/2016    3925-8020 The [employer]. [address] [place] [address]. All rights reserved. This information is not intended as a substitute for professional medical care. Always follow your healthcare professional's instructions.           Patient Education     Urinary Tract Infections in Women    Urinary tract infections (UTIs) are most often caused by bacteria. These bacteria enter the urinary tract. The bacteria may come from inside the body. Or they may travel from the skin outside the rectum or vagina into the urethra. Female anatomy makes it easy for bacteria from the bowel to enter a womans urinary tract, which is the most common source of UTI. This means women develop UTIs more often than men. Pain in or around the urinary tract is a common UTI symptom. But the only way to know for sure if you have a UTI for the healthcare provider to test your urine. The two tests that may be done are the urinalysis and urine culture.  Types of UTIs    Cystitis. A bladder infection (cystitis) is the most common UTI in women. You may have urgent or frequent need to pee. You may also have pain, burning when you pee, and bloody urine.    Urethritis. This is an inflamed urethra, which is the tube that carries urine from the bladder to outside the body. You may have lower stomach or back pain. You may also have urgent or frequent need to pee.    Pyelonephritis. This is a kidney infection. If not treated, it can be serious and damage your kidneys. In severe cases, you may need to stay in the hospital. You may have a fever and lower back pain.    Medicines to treat a UTI  Most UTIs are treated with antibiotics. These kill the bacteria. The length of time you need to  take them depends on the type of infection. It may be as short as 3 days. If you have repeated UTIs, you may need a low-dose antibiotic for several months. Take antibiotics exactly as directed. Dont stop taking them until all of the medicine is gone. If you stop taking the antibiotic too soon, the infection may not go away. You may also develop a resistance to the antibiotic. This can make it much harder to treat.  Lifestyle changes to treat and prevent UTIs  The lifestyle changes below will help get rid of your UTI. They may also help prevent future UTIs.    Drink plenty of fluids. This includes water, juice, or other caffeine-free drinks. Fluids help flush bacteria out of your body.    Empty your bladder. Always empty your bladder when you feel the urge to pee. And always pee before going to sleep. Urine that stays in your bladder can lead to infection. Try to pee before and after sex as well.    Practice good personal hygiene. Wipe yourself from front to back after using the toilet. This helps keep bacteria from getting into the urethra.    Use condoms during sex. These help prevent UTIs caused by sexually transmitted bacteria. Also don't use spermicides during sex. These can increase the risk for UTIs. Choose other forms of birth control instead. For women who tend to get UTIs after sex, a low-dose of a preventive antibiotic may be used. Be sure to discuss this option with your healthcare provider.    Follow up with your healthcare provider as directed. He or she may test to make sure the infection has cleared. If needed, more treatment may be started.  Date Last Reviewed: 7/1/2019 2000-2019 [employer]. [address] [place] [address], [place] [address]. All rights reserved. This information is not intended as a substitute for professional medical care. Always follow your healthcare professional's instructions.

## 2021-06-19 NOTE — PROGRESS NOTES
"[facility] Follow Up Note    Maria Fernanda Payton   22 y.o. female    Date of Visit: 8/3/2018    Chief Complaint   Patient presents with     STD Screen      STD screen      Subjective  Maria Fernanda is a 22-year-old female with history of anxiety and migraines, patient of Dr. Cano has this.  She is currently here requesting STD screening.    Patient reported that she did have Pap smear at the end of December after she turned 22 [facility] and results were normal.  Will obtain results.  Most recently she had a new sexual partner and has not been using condoms.  She denies any vaginal or urinary symptoms but would like to have STD screening done.  For birth control she is on Allison and denies any missed doses.  She denies any problems with her mood.    ROS A comprehensive review of systems was performed and was otherwise negative    Medications, allergies, social and family history, and the problem list were all reviewed and updated.    Old records reviewed: Yes    Social History     Social History Narrative    Mom- Ricarda, dad- Randy     brother - [place]       Exam  General Appearance: Pleasant and alert  Vitals:    08/03/18 1239   BP: 100/60   Patient Site: Left Arm   Patient Position: Sitting   Cuff Size: Adult Regular   Pulse: 64   Resp: 16   SpO2: 99%   Weight: 128 lb 6 oz (58.2 kg)   Height: 5' 4.5\" (1.638 m)      Body mass index is 21.7 kg/(m^2).  Wt Readings from Last 3 Encounters:   08/03/18 128 lb 6 oz (58.2 kg)   05/21/18 132 lb (59.9 kg)   01/24/18 126 lb (57.2 kg)     HEENT: conjunctiva is pink, no scleral icterus, no cervical LAD, no thyromegaly, oropharinx is clear.   Lungs: CTA bl, no wheezes, rales or forced expiratory wheezing.  Cardiac: RRR, no murmurs,rubs,gallops  Abdomen: Positive BS, Soft and non distended, non tender  Extremities: No edema bl  Skin: No rashes  Neuro: Moves all extremities and has facial symmetry  Gait: Ambulates with a normal gait    Assessment/Plan    1. Screen " for STD (sexually transmitted disease)  Patient had Pap smear done earlier this year the different clinic and will obtain results.  Discussed that she should use condoms consistently.  STD screening was done.  - Chlamydia trachomatis & Neisseria gonorrhoeae, Amplified Detection  - Hepatitis B Surface Antigen (HBsAG)  - Hepatitis C Antibody (Anti-HCV)  - HIV Antigen/Antibody Screening [place]  - Syphilis Screen, [place]    Angelia Li MD  8/3/2018    Much or all of the text in this note was generated through the use of Dragon Dictate voice-to-text software. Errors in spelling or words which seem out of context are unintentional. Sound alike errors, in particular, may have escaped editing.

## 2021-06-20 NOTE — LETTER
Letter by Kimberly Rodriguez PA-C at      Author: Kimberly Rodriguez PA-C Service: -- Author Type: --    Filed:  Encounter Date: 6/28/2020 Status: (Other)         June 28, 2020     Patient: Maria Fernanda Payton   YOB: 1995   Date of Visit: 6/28/2020       To Whom It May Concern:    It is my medical opinion that Maria Fernanda Payton may return to work on 6/29/20.    If you have any questions or concerns, please don't hesitate to call.    Sincerely,        Electronically signed by Kimberly Rodriguez PA-C

## 2021-06-26 ENCOUNTER — HEALTH MAINTENANCE LETTER (OUTPATIENT)
Age: 26
End: 2021-06-26

## 2021-06-29 NOTE — PROGRESS NOTES
Progress Notes by Bibi Brian MD at 6/6/2020  1:00 PM     Author: Bibi Brian MD Service: -- Author Type: Physician    Filed: 6/6/2020  1:34 PM Encounter Date: 6/6/2020 Status: Signed    : Bibi Brian MD (Physician)       Provider wore a mask and face shield during this visit.     Subjective:   Maria Fernanda Payton is a 24 y.o. female  Roomed by: Alfonso WHALEY CMA    Refills needed? No    Do you have any forms that need to be filled out? No      Chief Complaint   Patient presents with   ? Urinary Frequency     woke up with it this morning, bladder discomfort   Admits to recent urinary frequency, urgency, voiding in small amounts, and dysuria, but denies fever, chills or flank pain. Denies recent fevers, chills, sore throat, chest pain, new cough, shortness of breath, nasal congestion, runny nose, headaches, loss of taste or smell, body aches, fatigue, nausea, vomiting, or diarrhea.  Admits being able to drink fluids. Appetite has been normal.     Review of Systems  See HPI for ROS, otherwise balance of other systems negative    Allergies   Allergen Reactions   ? Amoxicillin      No current outpatient medications on file.  Patient Active Problem List   Diagnosis   ? Allergies   ? HSV infection     No past medical history on file. - if none on file, see Problem List    Objective:     Vitals:    06/06/20 1245   BP: 127/87   Patient Site: Right Arm   Patient Position: Sitting   Cuff Size: Adult Regular   Pulse: 74   Resp: 16   Temp: 98.1  F (36.7  C)   TempSrc: Oral   SpO2: 99%   Weight: 162 lb 6 oz (73.7 kg)   Gen -Patient in no apparent distress  MS - Negative CVA tenderness bilaterally    Results for orders placed or performed in visit on 06/06/20   Urinalysis-UC if Indicated   Result Value Ref Range    Color, UA Yellow Colorless, Yellow, Straw, Light Yellow    Clarity, UA Clear Clear    Glucose, UA Negative Negative    Bilirubin, UA Negative Negative    Ketones, UA Negative Negative    Specific  Gravity, UA 1.010 1.005 - 1.030    Blood, UA Moderate (!) Negative    pH, UA 7.0 5.0 - 8.0    Protein, UA Negative Negative mg/dL    Urobilinogen, UA 0.2 E.U./dL 0.2 E.U./dL, 1.0 E.U./dL    Nitrite, UA Negative Negative    Leukocytes, UA Moderate (!) Negative    Bacteria, UA Few (!) None Seen hpf    RBC, UA 10-25 (!) None Seen, 0-2 hpf    WBC, UA 10-25 (!) None Seen, 0-5 hpf    Squam Epithel, UA 0-5 None Seen, 0-5 lpf   Lab result discussed on day of visit.     Assessment - Plan   Medical Decision Making - Patient presents with symptoms of a UTI.  Denies recent fever, chills, malaise or flank tenderness.  On exam patient is afebrile and vital signs are stable. Exam is negative for CVA tenderness.  Urinalysis is indicative of an infectious process.  Because of the lack of systemic symptoms, acute pyelonephritis is highly unlikely and not suspected at this time.  Will treat with nitrofurantoin for acute cystitis.  Informed patient that she would only be called if there would be a need for a change in her antibiotics after the urine culture results are back.  Recommended patient follow-up with primary for symptoms not improved after completing antibiotics.  See patient instructions.    1. Bladder infection  - nitrofurantoin, macrocrystal-monohydrate, (MACROBID) 100 MG capsule; Take 1 capsule (100 mg total) by mouth 2 (two) times a day for 5 days.  Dispense: 10 capsule; Refill: 0    2. Urinary frequency  - Urinalysis-UC if Indicated  - Culture, Urine    At the conclusion of the encounter, assessment and plan were discussed.   All questions were answered.   The patient or guardian acknowledged understanding and was involved in the decision making regarding the overall care plan.    Patient Instructions   1. Drink plenty of liquids  2. Urinate frequently - try not to hold your urine if you have to go  3. May alternate tylenol every 6 hours with ibuprofen every 6 hours as needed for fever or pain  4. If follow up is  "needed, call and schedule an appointment which may be either by phone, video or in person.   5. If your symptoms are getting worse after 48 hours of antibiotics or you have any questions, call the clinic number - it's answered 24/7  6.  If you are having trouble with waiting too long for an answer from calling our clinic line, you may want to try a virtual visit through [email or web address]      Patient Education     Bladder Infection, Female (Adult)    Urine is normally doesn't have any bacteria in it. But bacteria can get into the urinary tract from the skin around the rectum. Or they can travel in the blood from elsewhere in the body. Once they are in your urinary tract, they can cause infection in the urethra (urethritis), the bladder (cystitis), or the kidneys (pyelonephritis).  The most common place for an infection is in the bladder. This is called a bladder infection. This is one of the most common infections in women. Most bladder infections are easily treated. They are not serious unless the infection spreads to the kidney.  The phrases \"bladder infection,\" \"UTI,\" and \"cystitis\" are often used to describe the same thing. But they are not always the same. Cystitis is an inflammation of the bladder. The most common cause of cystitis is an infection.  Symptoms  The infection causes inflammation in the urethra and bladder. This causes many of the symptoms. The most common symptoms of a bladder infection are:    Pain or burning when urinating    Having to urinate more often than usual    Urgent need to urinate    Only a small amount of urine comes out    Blood in urine    Abdominal discomfort. This is usually in the lower abdomen above the pubic bone.    Cloudy urine    Strong- or bad-smelling urine    Unable to urinate (urinary retention)    Unable to hold urine in (urinary incontinence)    Fever    Loss of appetite    Confusion (in older adults)  Causes  Bladder infections are not contagious. You can't get one from " someone else, from a toilet seat, or from sharing a bath.  The most common cause of bladder infections is bacteria from the bowels. The bacteria get onto the skin around the opening of the urethra. From there, they can get into the urine and travel up to the bladder, causing inflammation and infection. This usually happens because of:    Wiping improperly after urinating. Always wipe from front to back.    Bowel incontinence    Pregnancy    Procedures such as having a catheter inserted    Older age    Not emptying your bladder. This can allow bacteria a chance to grow in your urine.    Dehydration    Constipation    Sex    Use of a diaphragm for birth control   Treatment  Bladder infections are diagnosed by a urine test. They are treated with antibiotics and usually clear up quickly without complications. Treatment helps prevent a more serious kidney infection.  Medicines  Medicines can help in the treatment of a bladder infection:    Take antibiotics until they are used up, even if you feel better. It is important to finish them to make sure the infection has cleared.    You can use acetaminophen or ibuprofen for pain, fever, or discomfort, unless another medicine was prescribed. If you have chronic liver or kidney disease, talk with your healthcare provider before using these medicines. Also talk with your provider if you've ever had a stomach ulcer or gastrointestinal bleeding, or are taking blood-thinner medicines.    If you are given phenazopydridine to reduce burning with urination, it will cause your urine to become a bright orange color. This can stain clothing.  Care and prevention  These self-care steps can help prevent future infections:    Drink plenty of fluids to prevent dehydration and flush out your bladder. Do this unless you must restrict fluids for other health reasons, or your doctor told you not to.    Proper cleaning after going to the bathroom is important. Wipe from front to back after using  the toilet to prevent the spread of bacteria.    Urinate more often. Don't try to hold urine in for a long time.    Wear loose-fitting clothes and cotton underwear. Avoid tight-fitting pants.    Improve your diet and prevent constipation. Eat more fresh fruit and vegetables, and fiber, and less junk and fatty foods.    Avoid sex until your symptoms are gone.    Avoid caffeine, alcohol, and spicy foods. These can irritate your bladder.    Urinate right after intercourse to flush out your bladder.    If you use birth control pills and have frequent bladder infections, discuss it with your doctor.  Follow-up care  Call your healthcare provider if all symptoms are not gone after 3 days of treatment. This is especially important if you have repeat infections.  If a culture was done, you will be told if your treatment needs to be changed. If directed, you can call to find out the results.  If X-rays were done, you will be told if the results will affect your treatment.  Call 911  Call 911 if any of the following occur:    Trouble breathing    Hard to wake up or confusion    Fainting or loss of consciousness    Rapid heart rate  When to seek medical advice  Call your healthcare provider right away if any of these occur:    Fever of 100.4 F (38.0 C) or higher, or as directed by your healthcare provider    Symptoms are not better by the third day of treatment    Back or belly (abdominal) pain that gets worse    Repeated vomiting, or unable to keep medicine down    Weakness or dizziness    Vaginal discharge    Pain, redness, or swelling in the outer vaginal area (labia)  Date Last Reviewed: 10/1/2016    2961-4841 [employer]. [address] [place] [address], [place] [address]. All rights reserved. This information is not intended as a substitute for professional medical care. Always follow your healthcare professional's instructions.           Patient Education     Urinary Tract Infections in Women    Urinary tract  infections (UTIs) are most often caused by bacteria. These bacteria enter the urinary tract. The bacteria may come from inside the body. Or they may travel from the skin outside the rectum or vagina into the urethra. Female anatomy makes it easy for bacteria from the bowel to enter a womans urinary tract, which is the most common source of UTI. This means women develop UTIs more often than men. Pain in or around the urinary tract is a common UTI symptom. But the only way to know for sure if you have a UTI for the healthcare provider to test your urine. The two tests that may be done are the urinalysis and urine culture.  Types of UTIs    Cystitis. A bladder infection (cystitis) is the most common UTI in women. You may have urgent or frequent need to pee. You may also have pain, burning when you pee, and bloody urine.    Urethritis. This is an inflamed urethra, which is the tube that carries urine from the bladder to outside the body. You may have lower stomach or back pain. You may also have urgent or frequent need to pee.    Pyelonephritis. This is a kidney infection. If not treated, it can be serious and damage your kidneys. In severe cases, you may need to stay in the hospital. You may have a fever and lower back pain.    Medicines to treat a UTI  Most UTIs are treated with antibiotics. These kill the bacteria. The length of time you need to take them depends on the type of infection. It may be as short as 3 days. If you have repeated UTIs, you may need a low-dose antibiotic for several months. Take antibiotics exactly as directed. Dont stop taking them until all of the medicine is gone. If you stop taking the antibiotic too soon, the infection may not go away. You may also develop a resistance to the antibiotic. This can make it much harder to treat.  Lifestyle changes to treat and prevent UTIs  The lifestyle changes below will help get rid of your UTI. They may also help prevent future UTIs.    Drink plenty of  fluids. This includes water, juice, or other caffeine-free drinks. Fluids help flush bacteria out of your body.    Empty your bladder. Always empty your bladder when you feel the urge to pee. And always pee before going to sleep. Urine that stays in your bladder can lead to infection. Try to pee before and after sex as well.    Practice good personal hygiene. Wipe yourself from front to back after using the toilet. This helps keep bacteria from getting into the urethra.    Use condoms during sex. These help prevent UTIs caused by sexually transmitted bacteria. Also don't use spermicides during sex. These can increase the risk for UTIs. Choose other forms of birth control instead. For women who tend to get UTIs after sex, a low-dose of a preventive antibiotic may be used. Be sure to discuss this option with your healthcare provider.    Follow up with your healthcare provider as directed. He or she may test to make sure the infection has cleared. If needed, more treatment may be started.  Date Last Reviewed: 7/1/2019 2000-2019 [employer]. [address] [place] [address], [place] [address]. All rights reserved. This information is not intended as a substitute for professional medical care. Always follow your healthcare professional's instructions.

## 2021-07-03 NOTE — ADDENDUM NOTE
Addendum Note by Shantelle Barr MLT at 8/18/2020  2:00 PM     Author: Shantelle Barr MLT Service: -- Author Type:     Filed: 8/18/2020  4:53 PM Encounter Date: 8/18/2020 Status: Signed    : Shantelle Barr MLT ()    Addended by: SHANTELLE BARR on: 8/18/2020 04:53 PM        Modules accepted: Orders

## 2021-07-03 NOTE — ADDENDUM NOTE
Addendum Note by Aziza Carrillo CNP at 2/21/2020 10:40 AM     Author: Aziza Carrillo CNP Service: -- Author Type: Nurse Practitioner    Filed: 2/24/2020  8:48 AM Encounter Date: 2/21/2020 Status: Signed    : Aziza Carrillo CNP (Nurse Practitioner)    Addended by: AZIZA CARRILLO on: 2/24/2020 08:48 AM        Modules accepted: Orders

## 2021-07-04 NOTE — ADDENDUM NOTE
Addendum Note by Isaias Shepard MLT at 3/17/2021 11:40 AM     Author: Isaias Shepard MLT Service: -- Author Type:     Filed: 3/17/2021  1:24 PM Encounter Date: 3/17/2021 Status: Signed    : Isaias Shepard MLT ()    Addended by: ISAIAS SHEPARD on: 3/17/2021 01:24 PM        Modules accepted: Orders

## 2021-10-16 ENCOUNTER — HEALTH MAINTENANCE LETTER (OUTPATIENT)
Age: 26
End: 2021-10-16

## 2022-05-06 ENCOUNTER — TRANSFERRED RECORDS (OUTPATIENT)
Dept: HEALTH INFORMATION MANAGEMENT | Facility: CLINIC | Age: 27
End: 2022-05-06
Payer: COMMERCIAL

## 2022-05-18 ENCOUNTER — OFFICE VISIT (OUTPATIENT)
Dept: FAMILY MEDICINE | Facility: CLINIC | Age: 27
End: 2022-05-18
Payer: COMMERCIAL

## 2022-05-18 VITALS
RESPIRATION RATE: 16 BRPM | WEIGHT: 133 LBS | DIASTOLIC BLOOD PRESSURE: 85 MMHG | OXYGEN SATURATION: 100 % | SYSTOLIC BLOOD PRESSURE: 136 MMHG | HEART RATE: 82 BPM | TEMPERATURE: 98.2 F | BODY MASS INDEX: 21.8 KG/M2

## 2022-05-18 DIAGNOSIS — N30.00 ACUTE CYSTITIS WITHOUT HEMATURIA: Primary | ICD-10-CM

## 2022-05-18 DIAGNOSIS — R39.9 UTI SYMPTOMS: ICD-10-CM

## 2022-05-18 LAB
ALBUMIN UR-MCNC: NEGATIVE MG/DL
APPEARANCE UR: CLEAR
BACTERIA #/AREA URNS HPF: ABNORMAL /HPF
BILIRUB UR QL STRIP: NEGATIVE
COLOR UR AUTO: YELLOW
GLUCOSE UR STRIP-MCNC: NEGATIVE MG/DL
HGB UR QL STRIP: NEGATIVE
KETONES UR STRIP-MCNC: NEGATIVE MG/DL
LEUKOCYTE ESTERASE UR QL STRIP: ABNORMAL
MUCOUS THREADS #/AREA URNS LPF: PRESENT /LPF
NITRATE UR QL: NEGATIVE
PH UR STRIP: 6 [PH] (ref 5–8)
RBC #/AREA URNS AUTO: ABNORMAL /HPF
RENAL EPI CELLS #/AREA URNS HPF: ABNORMAL /HPF
SP GR UR STRIP: 1.01 (ref 1–1.03)
SQUAMOUS #/AREA URNS AUTO: ABNORMAL /LPF
TRANS CELLS #/AREA URNS HPF: ABNORMAL /HPF
UROBILINOGEN UR STRIP-ACNC: 0.2 E.U./DL
WBC #/AREA URNS AUTO: ABNORMAL /HPF
WBC CLUMPS #/AREA URNS HPF: PRESENT /HPF

## 2022-05-18 PROCEDURE — 81001 URINALYSIS AUTO W/SCOPE: CPT | Performed by: PHYSICIAN ASSISTANT

## 2022-05-18 PROCEDURE — 99213 OFFICE O/P EST LOW 20 MIN: CPT | Performed by: PHYSICIAN ASSISTANT

## 2022-05-18 PROCEDURE — 87086 URINE CULTURE/COLONY COUNT: CPT | Performed by: PHYSICIAN ASSISTANT

## 2022-05-18 RX ORDER — NITROFURANTOIN 25; 75 MG/1; MG/1
100 CAPSULE ORAL 2 TIMES DAILY
Qty: 10 CAPSULE | Refills: 0 | Status: SHIPPED | OUTPATIENT
Start: 2022-05-18 | End: 2022-05-23

## 2022-05-18 ASSESSMENT — ENCOUNTER SYMPTOMS
ABDOMINAL PAIN: 0
FEVER: 0
NAUSEA: 0
HEMATURIA: 1
VOMITING: 0
CHILLS: 0
FREQUENCY: 1
FLANK PAIN: 0
DYSURIA: 1

## 2022-05-18 NOTE — PROGRESS NOTES
Patient presents with:  UTI: Painful urination, frequency, blood in urine      Clinical Decision Making: Patient experiencing irritative voiding symptoms and UA is moderately indicative of UTI.  Patient started on Macrobid urine culture is pending.  Patient will follow up if symptoms fail to improve over the course the next 3 days.  Currently no exam findings concerning for pyelonephritis.      ICD-10-CM    1. Acute cystitis without hematuria  N30.00 nitroFURantoin macrocrystal-monohydrate (MACROBID) 100 MG capsule   2. UTI symptoms  R39.9 UA macro with reflex to Microscopic and Culture - Clinc Collect     Urine Microscopic     Urine Culture       Patient Instructions   1. Increase fluid intake  2. Complete antibiotic regimen as prescribed. You will be notified if the treatment plan needs to be changed based on the urine culture results.   3. Follow if you have a fever of 100.4 F (38 C) or higher, no improvement after three days of treatment, trouble urinating because of pain,new or increased discharge from the vagina, rash or joint pain, Increased back or abdominal pain.        HPI:  Maria Fernanda Payton is a 26 year old female who presents today complaining of painful urination, urinary frequency, blood in urine on and off for the past 3 days.    History obtained from the patient.    Problem List:  2016-06: HSV infection  Seasonal allergic rhinitis      No past medical history on file.    Social History     Tobacco Use     Smoking status: Never Smoker     Smokeless tobacco: Never Used   Substance Use Topics     Alcohol use: Not on file       Review of Systems   Constitutional: Negative for chills and fever.   Gastrointestinal: Negative for abdominal pain, nausea and vomiting.   Genitourinary: Positive for dysuria, frequency and hematuria. Negative for flank pain, vaginal discharge and vaginal pain.       Vitals:    05/18/22 1825   BP: 136/85   Pulse: 82   Resp: 16   Temp: 98.2  F (36.8  C)   TempSrc: Oral   SpO2:  100%   Weight: 60.3 kg (133 lb)       Physical Exam  Vitals and nursing note reviewed.   Constitutional:       General: She is not in acute distress.     Appearance: She is not ill-appearing.   HENT:      Head: Normocephalic and atraumatic.      Right Ear: External ear normal.      Left Ear: External ear normal.   Eyes:      Conjunctiva/sclera: Conjunctivae normal.   Abdominal:      General: Abdomen is flat. There is no distension.      Tenderness: There is no abdominal tenderness. There is no right CVA tenderness, left CVA tenderness or guarding.   Neurological:      Mental Status: She is alert.   Psychiatric:         Mood and Affect: Mood normal.         Behavior: Behavior normal.         Thought Content: Thought content normal.         Judgment: Judgment normal.         Results:  Results for orders placed or performed in visit on 05/18/22   UA macro with reflex to Microscopic and Culture - Clinc Collect     Status: Abnormal    Specimen: Urine, Clean Catch   Result Value Ref Range    Color Urine Yellow Colorless, Straw, Light Yellow, Yellow    Appearance Urine Clear Clear    Glucose Urine Negative Negative mg/dL    Bilirubin Urine Negative Negative    Ketones Urine Negative Negative mg/dL    Specific Gravity Urine 1.010 1.005 - 1.030    Blood Urine Negative Negative    pH Urine 6.0 5.0 - 8.0    Protein Albumin Urine Negative Negative mg/dL    Urobilinogen Urine 0.2 0.2, 1.0 E.U./dL    Nitrite Urine Negative Negative    Leukocyte Esterase Urine Small (A) Negative   Urine Microscopic     Status: Abnormal   Result Value Ref Range    Bacteria Urine Few (A) None Seen /HPF    RBC Urine 0-2 0-2 /HPF /HPF    WBC Urine 10-25 (A) 0-5 /HPF /HPF    Squamous Epithelials Urine Few (A) None Seen /LPF    WBC Clumps Urine Present (A) None Seen /HPF    Transitional Epithelials Urine Few (A) None Seen /HPF    Renal Tubular Epithelials Urine Few (A) None Seen /HPF    Mucus Urine Present (A) None Seen /LPF         At the end of the  encounter, I discussed results, diagnosis, medications. Discussed red flags for immediate return to clinic/ER, as well as indications for follow up if no improvement. Patient understood and agreed to plan. Patient was stable for discharge.

## 2022-05-20 LAB — BACTERIA UR CULT: NORMAL

## 2022-05-28 ENCOUNTER — HEALTH MAINTENANCE LETTER (OUTPATIENT)
Age: 27
End: 2022-05-28

## 2022-10-01 ENCOUNTER — HEALTH MAINTENANCE LETTER (OUTPATIENT)
Age: 27
End: 2022-10-01

## 2023-04-04 ENCOUNTER — OFFICE VISIT (OUTPATIENT)
Dept: FAMILY MEDICINE | Facility: CLINIC | Age: 28
End: 2023-04-04
Payer: COMMERCIAL

## 2023-04-04 VITALS
TEMPERATURE: 98.3 F | SYSTOLIC BLOOD PRESSURE: 110 MMHG | DIASTOLIC BLOOD PRESSURE: 76 MMHG | HEART RATE: 115 BPM | OXYGEN SATURATION: 95 %

## 2023-04-04 DIAGNOSIS — J02.0 STREPTOCOCCAL PHARYNGITIS: Primary | ICD-10-CM

## 2023-04-04 LAB — DEPRECATED S PYO AG THROAT QL EIA: POSITIVE

## 2023-04-04 PROCEDURE — 87880 STREP A ASSAY W/OPTIC: CPT | Performed by: PHYSICIAN ASSISTANT

## 2023-04-04 PROCEDURE — 99213 OFFICE O/P EST LOW 20 MIN: CPT | Performed by: PHYSICIAN ASSISTANT

## 2023-04-04 RX ORDER — AZITHROMYCIN 250 MG/1
TABLET, FILM COATED ORAL
Qty: 6 TABLET | Refills: 0 | Status: SHIPPED | OUTPATIENT
Start: 2023-04-04 | End: 2023-04-09

## 2023-04-04 NOTE — PATIENT INSTRUCTIONS
Patient was educated on the natural course of bacterial throat infection. Take medications as prescribed. Side effects discussed. Conservative measures discussed including warm fluids, salt water gargles, Lozenges (Cepacol), and over-the-counter analgesics (Tylenol or Ibuprofen). To prevent spread avoid sharing utensils or glasses until she has completed 24 hours of antibiotic treatment.  Change toothbrush after 24 hrs of being on antibiotic. See your primary care provider if symptoms worsen or do not improve in 5 days. Seek emergency care if you develop severe throat pain, or difficulty swallowing.

## 2023-04-04 NOTE — PROGRESS NOTES
URGENT CARE VISIT:    SUBJECTIVE:   Maria Fernanda Payton is a 27 year old female presenting with a chief complaint of chills, sore throat and body aches.  Onset was 1 day(s) ago.   She denies the following symptoms: stuffy nose, cough - non-productive, vomiting and diarrhea  Course of illness is same.    Treatment measures tried include None tried with no relief of symptoms.  Predisposing factors include None.    PMH: History reviewed. No pertinent past medical history.  Allergies: Amoxicillin   Medications:   Current Outpatient Medications   Medication Sig Dispense Refill     azithromycin (ZITHROMAX) 250 MG tablet Take 2 tablets (500 mg) by mouth daily for 1 day, THEN 1 tablet (250 mg) daily for 4 days. 6 tablet 0     predniSONE (DELTASONE) 20 MG tablet [PREDNISONE (DELTASONE) 20 MG TABLET] Take 40 mg by mouth daily. (Patient not taking: Reported on 5/18/2022) 10 tablet 0     SALINE NASAL 0.65 % nasal spray [SALINE NASAL 0.65 % NASAL SPRAY] 1 SPRAY INTO EACH NOSTRIL AS NEEDED FOR RHINITIS. (Patient not taking: Reported on 5/18/2022)       KACEY 3-0.03 mg per tablet [KACEY 3-0.03 MG PER TABLET] Take 1 tablet by mouth daily. (Patient not taking: Reported on 5/18/2022)       Social History:   Social History     Tobacco Use     Smoking status: Never     Smokeless tobacco: Never   Vaping Use     Vaping status: Not on file   Substance Use Topics     Alcohol use: Not on file       ROS:  Review of systems negative except as stated above.    OBJECTIVE:  /76 (BP Location: Right arm, Patient Position: Sitting, Cuff Size: Adult Regular)   Pulse 115   Temp 98.3  F (36.8  C) (Tympanic)   SpO2 95%   GENERAL APPEARANCE: healthy, alert and no distress  EYES: EOMI,  PERRL, conjunctiva clear  HENT: ear canals and TM's normal.  Moderately erythematous oropharynx with petechiae on soft palate  NECK: supple, nontender, no lymphadenopathy  RESP: lungs clear to auscultation - no rales, rhonchi or wheezes  CV: regular rates and  rhythm, normal S1 S2, no murmur noted  SKIN: no suspicious lesions or rashes    Labs:    Results for orders placed or performed in visit on 04/04/23   Streptococcus A Rapid Screen w/Reflex to PCR - Clinic Collect     Status: Abnormal    Specimen: Throat; Swab   Result Value Ref Range    Group A Strep antigen Positive (A) Negative       ASSESSMENT:    ICD-10-CM    1. Streptococcal pharyngitis  J02.0 Streptococcus A Rapid Screen w/Reflex to PCR - Clinic Collect     azithromycin (ZITHROMAX) 250 MG tablet          PLAN:  Patient Instructions   Patient was educated on the natural course of bacterial throat infection. Take medications as prescribed. Side effects discussed. Conservative measures discussed including warm fluids, salt water gargles, Lozenges (Cepacol), and over-the-counter analgesics (Tylenol or Ibuprofen). To prevent spread avoid sharing utensils or glasses until she has completed 24 hours of antibiotic treatment.  Change toothbrush after 24 hrs of being on antibiotic. See your primary care provider if symptoms worsen or do not improve in 5 days. Seek emergency care if you develop severe throat pain, or difficulty swallowing.  Patient verbalized understanding and is agreeable to plan. The patient was discharged ambulatory and in stable condition.    Monique Hoskins PA-C ....................  4/4/2023   2:13 PM

## 2023-04-04 NOTE — LETTER
April 4, 2023      Maria Fernanda Payton  [address] [facility] [id number]        To Whom It May Concern:    Maria Fernanda Payton was seen in our clinic. She may return to work on 4/6/23.      Sincerely,        Monique Hoskins PA-C

## 2023-04-25 ENCOUNTER — TRANSFERRED RECORDS (OUTPATIENT)
Dept: HEALTH INFORMATION MANAGEMENT | Facility: CLINIC | Age: 28
End: 2023-04-25
Payer: COMMERCIAL

## 2023-05-01 ENCOUNTER — TRANSFERRED RECORDS (OUTPATIENT)
Dept: MULTI SPECIALTY CLINIC | Facility: CLINIC | Age: 28
End: 2023-05-01

## 2023-05-01 LAB — PAP SMEAR - HIM PATIENT REPORTED: NEGATIVE

## 2023-05-25 ENCOUNTER — TRANSFERRED RECORDS (OUTPATIENT)
Dept: HEALTH INFORMATION MANAGEMENT | Facility: CLINIC | Age: 28
End: 2023-05-25
Payer: COMMERCIAL

## 2023-06-04 ENCOUNTER — HEALTH MAINTENANCE LETTER (OUTPATIENT)
Age: 28
End: 2023-06-04

## 2023-06-28 NOTE — PATIENT INSTRUCTIONS - HE
"Analysis of your urine showed signs of a urinary tract infection.     Take the prescribed antibiotics for the entire course, even if symptoms improve.  You should expect improvement to begin in 24 hours. In the meantime, continue to drink plenty of fluids.  Recommend daily use of a probiotic while taking prescribed medication (a common brand is Culturelle, yogurt with \"active cultures\" are also appropriate).    Reasons to come back for re-evaluation:  - Develop a fever, back or flank pain, or nausea and vomiting  - If symptoms have not completely improved after 72 hours  - Recurrent symptoms within a few weeks after treatment has concluded    " Have You Had Botox?: has had prior Botox

## 2023-10-16 ENCOUNTER — OFFICE VISIT (OUTPATIENT)
Dept: INTERNAL MEDICINE | Facility: CLINIC | Age: 28
End: 2023-10-16
Payer: COMMERCIAL

## 2023-10-16 VITALS
RESPIRATION RATE: 20 BRPM | TEMPERATURE: 98.5 F | DIASTOLIC BLOOD PRESSURE: 85 MMHG | BODY MASS INDEX: 29.23 KG/M2 | SYSTOLIC BLOOD PRESSURE: 121 MMHG | WEIGHT: 171.2 LBS | HEART RATE: 84 BPM | HEIGHT: 64 IN | OXYGEN SATURATION: 100 %

## 2023-10-16 DIAGNOSIS — Z00.00 ROUTINE GENERAL MEDICAL EXAMINATION AT A HEALTH CARE FACILITY: Primary | ICD-10-CM

## 2023-10-16 LAB
ALBUMIN SERPL BCG-MCNC: 4.6 G/DL (ref 3.5–5.2)
ALP SERPL-CCNC: 83 U/L (ref 35–104)
ALT SERPL W P-5'-P-CCNC: 15 U/L (ref 0–50)
ANION GAP SERPL CALCULATED.3IONS-SCNC: 14 MMOL/L (ref 7–15)
AST SERPL W P-5'-P-CCNC: 20 U/L (ref 0–45)
BASO+EOS+MONOS # BLD AUTO: ABNORMAL 10*3/UL
BASO+EOS+MONOS NFR BLD AUTO: ABNORMAL %
BASOPHILS # BLD AUTO: 0 10E3/UL (ref 0–0.2)
BASOPHILS NFR BLD AUTO: 0 %
BILIRUB SERPL-MCNC: 0.4 MG/DL
BUN SERPL-MCNC: 16 MG/DL (ref 6–20)
CALCIUM SERPL-MCNC: 9.3 MG/DL (ref 8.6–10)
CHLORIDE SERPL-SCNC: 101 MMOL/L (ref 98–107)
CHOLEST SERPL-MCNC: 137 MG/DL
CREAT SERPL-MCNC: 0.64 MG/DL (ref 0.51–0.95)
DEPRECATED HCO3 PLAS-SCNC: 22 MMOL/L (ref 22–29)
EGFRCR SERPLBLD CKD-EPI 2021: >90 ML/MIN/1.73M2
EOSINOPHIL # BLD AUTO: 0.2 10E3/UL (ref 0–0.7)
EOSINOPHIL NFR BLD AUTO: 3 %
ERYTHROCYTE [DISTWIDTH] IN BLOOD BY AUTOMATED COUNT: 12.2 % (ref 10–15)
GLUCOSE SERPL-MCNC: 92 MG/DL (ref 70–99)
HCT VFR BLD AUTO: 39.4 % (ref 35–47)
HDLC SERPL-MCNC: 53 MG/DL
HGB BLD-MCNC: 13.8 G/DL (ref 11.7–15.7)
IMM GRANULOCYTES # BLD: 0 10E3/UL
IMM GRANULOCYTES NFR BLD: 0 %
LDLC SERPL CALC-MCNC: 63 MG/DL
LYMPHOCYTES # BLD AUTO: 2.4 10E3/UL (ref 0.8–5.3)
LYMPHOCYTES NFR BLD AUTO: 35 %
MCH RBC QN AUTO: 33.1 PG (ref 26.5–33)
MCHC RBC AUTO-ENTMCNC: 35 G/DL (ref 31.5–36.5)
MCV RBC AUTO: 95 FL (ref 78–100)
MONOCYTES # BLD AUTO: 0.5 10E3/UL (ref 0–1.3)
MONOCYTES NFR BLD AUTO: 8 %
NEUTROPHILS # BLD AUTO: 3.6 10E3/UL (ref 1.6–8.3)
NEUTROPHILS NFR BLD AUTO: 54 %
NONHDLC SERPL-MCNC: 84 MG/DL
PLATELET # BLD AUTO: 292 10E3/UL (ref 150–450)
POTASSIUM SERPL-SCNC: 4.3 MMOL/L (ref 3.4–5.3)
PROT SERPL-MCNC: 7.3 G/DL (ref 6.4–8.3)
RBC # BLD AUTO: 4.17 10E6/UL (ref 3.8–5.2)
SODIUM SERPL-SCNC: 137 MMOL/L (ref 135–145)
TRIGL SERPL-MCNC: 107 MG/DL
TSH SERPL DL<=0.005 MIU/L-ACNC: 2.09 UIU/ML (ref 0.3–4.2)
WBC # BLD AUTO: 6.7 10E3/UL (ref 4–11)

## 2023-10-16 PROCEDURE — 36415 COLL VENOUS BLD VENIPUNCTURE: CPT | Performed by: INTERNAL MEDICINE

## 2023-10-16 PROCEDURE — 84443 ASSAY THYROID STIM HORMONE: CPT | Performed by: INTERNAL MEDICINE

## 2023-10-16 PROCEDURE — 85025 COMPLETE CBC W/AUTO DIFF WBC: CPT | Performed by: INTERNAL MEDICINE

## 2023-10-16 PROCEDURE — 80053 COMPREHEN METABOLIC PANEL: CPT | Performed by: INTERNAL MEDICINE

## 2023-10-16 PROCEDURE — 99395 PREV VISIT EST AGE 18-39: CPT | Performed by: INTERNAL MEDICINE

## 2023-10-16 PROCEDURE — 80061 LIPID PANEL: CPT | Performed by: INTERNAL MEDICINE

## 2023-10-16 ASSESSMENT — PAIN SCALES - GENERAL: PAINLEVEL: NO PAIN (0)

## 2023-10-16 ASSESSMENT — ENCOUNTER SYMPTOMS
NERVOUS/ANXIOUS: 0
ABDOMINAL PAIN: 0
HEARTBURN: 0
NAUSEA: 0
BREAST MASS: 0
DIZZINESS: 0
SHORTNESS OF BREATH: 0
CONSTIPATION: 0
MYALGIAS: 0
DIARRHEA: 0
HEADACHES: 0
PARESTHESIAS: 0
HEMATURIA: 0
FREQUENCY: 0
FEVER: 0
HEMATOCHEZIA: 0
EYE PAIN: 0
PALPITATIONS: 0
WEAKNESS: 0
ARTHRALGIAS: 0
COUGH: 0
CHILLS: 0
SORE THROAT: 0
JOINT SWELLING: 0
DYSURIA: 0

## 2023-10-16 NOTE — PROGRESS NOTES
SUBJECTIVE:   CC: Maria Fernanda is an 28 year old who presents for preventive health visit.       10/16/2023    11:30 AM   Additional Questions   Roomed by BARRETT Naik   Accompanied by JOHNATHAN       Healthy Habits:     Getting at least 3 servings of Calcium per day:  Yes    Bi-annual eye exam:  NO    Dental care twice a year:  Yes    Sleep apnea or symptoms of sleep apnea:  None    Diet:  Regular (no restrictions)    Frequency of exercise:  4-5 days/week    Duration of exercise:  45-60 minutes    Taking medications regularly:  Yes    Medication side effects:  Not applicable and None    Additional concerns today:  No    Of birth control for last 2 years. Does use condoms for contraception if needed. Not in long term relationship right now.     Did see [facility] in [place] in May, they did a Pap that was negative. STI testing negative at that time.     3-4 mile walks 3-4 times a week. She plans to get a gym membership in the new year. Is doing some at home work outs. Gained ~20 lbs in last two years. Is working on adding in fruits and veggies and cooking at home more.     Occasional loose stools, thinks some mild IBS.     Works as a , fairly regular schedule 4-9 PM.     Vaping right now daily. Working to cut back. Plans to work with therapist on this.     Have you ever done Advance Care Planning? (For example, a Health Directive, POLST, or a discussion with a medical provider or your loved ones about your wishes): No, advance care planning information given to patient to review.  Advanced care planning was discussed at today's visit.    Social History     Tobacco Use    Smoking status: Never    Smokeless tobacco: Never   Substance Use Topics    Alcohol use: Not on file           10/16/2023    11:31 AM   Alcohol Use   Prescreen: >3 drinks/day or >7 drinks/week? No       Reviewed orders with patient.  Reviewed health maintenance and updated orders accordingly - Yes  Lab work is in process    Breast Cancer  Screening:  Any new diagnosis of family breast, ovarian, or bowel cancer? No    FHS-7:       10/16/2023    11:34 AM   Breast CA Risk Assessment (FHS-7)   Did any of your first-degree relatives have breast or ovarian cancer? No   Did any of your relatives have bilateral breast cancer? No   Did any man in your family have breast cancer? No   Did any woman in your family have breast and ovarian cancer? Yes   Did any woman in your family have breast cancer before age 50 y? No   Do you have 2 or more relatives with breast and/or ovarian cancer? No   Do you have 2 or more relatives with breast and/or bowel cancer? No       Patient under 40 years of age: Routine Mammogram Screening not recommended.   Pertinent mammograms are reviewed under the imaging tab.    History of abnormal Pap smear: NO - age 21-29 PAP every 3 years recommended      Latest Ref Rng & Units 1/3/2020    12:55 PM   PAP / HPV   PAP Negative for squamous intraepithelial lesion or malignancy. Negative for squamous intraepithelial lesion or malignancy  Electronically signed by Sandra Nguyen CT (ASCP) on 1/7/2020 at  9:05 AM        Reviewed and updated as needed this visit by clinical staff   Tobacco  Allergies  Meds  Problems  Med Hx  Surg Hx  Fam Hx          Reviewed and updated as needed this visit by Provider   Tobacco  Allergies  Meds  Problems  Med Hx  Surg Hx  Fam Hx         History reviewed. No pertinent past medical history.   History reviewed. No pertinent surgical history.    Review of Systems   Constitutional:  Negative for chills and fever.   HENT:  Negative for congestion, ear pain, hearing loss and sore throat.    Eyes:  Negative for pain and visual disturbance.   Respiratory:  Negative for cough and shortness of breath.    Cardiovascular:  Negative for chest pain, palpitations and peripheral edema.   Gastrointestinal:  Negative for abdominal pain, constipation, diarrhea, heartburn, hematochezia and nausea.   Breasts:   "Negative for tenderness, breast mass and discharge.   Genitourinary:  Negative for dysuria, frequency, genital sores, hematuria, pelvic pain, urgency, vaginal bleeding and vaginal discharge.   Musculoskeletal:  Negative for arthralgias, joint swelling and myalgias.   Skin:  Negative for rash.   Neurological:  Negative for dizziness, weakness, headaches and paresthesias.   Psychiatric/Behavioral:  Negative for mood changes. The patient is not nervous/anxious.         OBJECTIVE:   /85 (BP Location: Right arm, Patient Position: Sitting, Cuff Size: Adult Regular)   Pulse 84   Temp 98.5  F (36.9  C) (Oral)   Resp 20   Ht 1.633 m (5' 4.3\")   Wt 77.7 kg (171 lb 3.2 oz)   LMP 09/21/2023 (Exact Date)   SpO2 100%   BMI 29.11 kg/m    Physical Exam  GENERAL: healthy, alert and no distress  EYES: Eyes grossly normal to inspection, PERRL and conjunctivae and sclerae normal  HENT: ear canals and TM's normal, nose and mouth without ulcers or lesions  NECK: no adenopathy, no asymmetry, masses, or scars and thyroid normal to palpation  RESP: lungs clear to auscultation - no rales, rhonchi or wheezes  BREAST: normal without masses, tenderness or nipple discharge and no palpable axillary masses or adenopathy  CV: regular rate and rhythm, normal S1 S2, no S3 or S4, no murmur, click or rub, no peripheral edema and peripheral pulses strong  ABDOMEN: soft, nontender, no hepatosplenomegaly, no masses and bowel sounds normal  MS: no gross musculoskeletal defects noted, no edema  SKIN: no suspicious lesions or rashes  NEURO: Normal strength and tone, mentation intact and speech normal  PSYCH: mentation appears normal, affect normal/bright    Diagnostic Test Results:  Labs reviewed in Epic    ASSESSMENT/PLAN:     Problem List Items Addressed This Visit          Other    Routine general medical examination at a health care facility - Primary     We discussed healthy lifestyle, nutrition, cardiovascular risk reduction, self care, " safety, sunscreen, and timing of cancer screening.  Health maintenance screening and immunizations reviewed with the patient.    - Will update labs today  - Recommended addition of strength/resistance training for some weight loss, continued healthy diet choices  - Condoms for contraception  - Can try fiber supplement for intermittent loose stools  - Pap normal 5/2023 per her report, [facility] for her GYN filled out today   - Declined flu and COVID vaccines   - She will work with therapist to cut back vaping, will let me know if she wants to try NRT in the future  - Follow up yearly for the annual physical.         Relevant Orders    Comprehensive metabolic panel    TSH with free T4 reflex    CBC with platelets and differential (Completed)    Lipid Profile (Chol, Trig, HDL, LDL calc)       Patient has been advised of split billing requirements and indicates understanding: Yes      COUNSELING:  Reviewed preventive health counseling, as reflected in patient instructions  Special attention given to:        Regular exercise       Healthy diet/nutrition       Immunizations  Declined: Covid-19 and Influenza due to Concerns about side effects/safety           Safe sex practices/STD prevention      She reports that she has never smoked. She has never used smokeless tobacco.          Anabel Bull MD  [facility]

## 2023-10-16 NOTE — Clinical Note
Please abstract the following data from this visit with this patient into the appropriate field in Epic:  Tests that can be patient reported without a hard copy:  Pap smear done on this date: 5/2023 (approximately), by this group: [facility], results were NILM, negative.   Other Tests found in the patient's chart through Chart Review/Care Everywhere:  {Abstract Quality List (Optional):921841}  Note to Abstraction: If this section is blank, no results were found via Chart Review/Care Everywhere.

## 2023-10-16 NOTE — ASSESSMENT & PLAN NOTE
We discussed healthy lifestyle, nutrition, cardiovascular risk reduction, self care, safety, sunscreen, and timing of cancer screening.  Health maintenance screening and immunizations reviewed with the patient.    - Will update labs today  - Recommended addition of strength/resistance training for some weight loss, continued healthy diet choices  - Condoms for contraception  - Can try fiber supplement for intermittent loose stools  - Pap normal 5/2023 per her report, [facility] for her GYN filled out today   - Declined flu and COVID vaccines   - She will work with therapist to cut back vaping, will let me know if she wants to try NRT in the future  - Follow up yearly for the annual physical.

## 2023-10-23 ENCOUNTER — TELEPHONE (OUTPATIENT)
Dept: INTERNAL MEDICINE | Facility: CLINIC | Age: 28
End: 2023-10-23

## 2023-10-26 ENCOUNTER — DOCUMENTATION ONLY (OUTPATIENT)
Dept: OTHER | Facility: CLINIC | Age: 28
End: 2023-10-26
Payer: COMMERCIAL

## 2023-11-02 ENCOUNTER — TRANSFERRED RECORDS (OUTPATIENT)
Dept: HEALTH INFORMATION MANAGEMENT | Facility: CLINIC | Age: 28
End: 2023-11-02
Payer: COMMERCIAL

## 2024-03-12 ENCOUNTER — OFFICE VISIT (OUTPATIENT)
Dept: FAMILY MEDICINE | Facility: CLINIC | Age: 29
End: 2024-03-12
Payer: COMMERCIAL

## 2024-03-12 VITALS
DIASTOLIC BLOOD PRESSURE: 72 MMHG | OXYGEN SATURATION: 98 % | HEIGHT: 64 IN | BODY MASS INDEX: 28.97 KG/M2 | WEIGHT: 169.7 LBS | RESPIRATION RATE: 16 BRPM | TEMPERATURE: 98.2 F | SYSTOLIC BLOOD PRESSURE: 114 MMHG | HEART RATE: 91 BPM

## 2024-03-12 DIAGNOSIS — R22.31 MASS OF RIGHT AXILLA: Primary | ICD-10-CM

## 2024-03-12 DIAGNOSIS — J02.9 SORE THROAT: ICD-10-CM

## 2024-03-12 LAB
DEPRECATED S PYO AG THROAT QL EIA: NEGATIVE
GROUP A STREP BY PCR: NOT DETECTED

## 2024-03-12 PROCEDURE — 99213 OFFICE O/P EST LOW 20 MIN: CPT | Performed by: STUDENT IN AN ORGANIZED HEALTH CARE EDUCATION/TRAINING PROGRAM

## 2024-03-12 PROCEDURE — 87651 STREP A DNA AMP PROBE: CPT | Performed by: STUDENT IN AN ORGANIZED HEALTH CARE EDUCATION/TRAINING PROGRAM

## 2024-03-12 RX ORDER — TRETINOIN 0.5 MG/G
CREAM TOPICAL
COMMUNITY
Start: 2023-09-18

## 2024-03-12 RX ORDER — BIOTIN 10000 MCG
CAPSULE ORAL
COMMUNITY

## 2024-03-12 NOTE — PROGRESS NOTES
"  Assessment & Plan   Problem List Items Addressed This Visit    None  Visit Diagnoses       Mass of right axilla    -  Primary    Relevant Orders    US Head Neck Soft Tissue    Sore throat        Relevant Orders    Streptococcus A Rapid Screen w/Reflex to PCR - Clinic Collect (Completed)    Group A Streptococcus PCR Throat Swab           Right axillary mass:  Mobile mass that is soft to the touch.  No significant tender to palpation elicited from the patient.  No overlying erythema, drainage.  No other masses appreciated.  Most likely cyst vs lymph node.  Will proceed with ultrasound of the area for further clarification of the structure.    Viral URI:  Patient has significant postnasal drip on exam with some tonsillitis  Did do strep swab in the clinic today and it was negative  Anticipatory guidance provided       Subjective   Maria Fernanda is a 28 year old, presenting for the following health issues:  Lump  (Under right armpit x10 days- no change)        3/12/2024     1:13 PM   Additional Questions   Roomed by Joesph Vaqsuez     HPI     Few days ago, patient noticed a lump under the right armpit.  She denies any trauma, recent illness that preceded the bump  Patient does not shave in the area.  Is undergoing laser hair removal  The bump is slightly tender to palpation, \"squishy\" and mobile  Has not changed in size, is not associate with any drainage, redness, fevers or chills  She has started to experience a sore throat within the last 2 or 3 days but the bump preceded that.  She is not sure if it is a fatty deposit.  Her grandma and great grandma did have breast cancer in their 80s.  Patient herself vapes.        Review of Systems  As per HPI       Objective    /72 (BP Location: Right arm, Patient Position: Sitting, Cuff Size: Adult Regular)   Pulse 91   Temp 98.2  F (36.8  C) (Oral)   Resp 16   Ht 1.633 m (5' 4.3\")   Wt 77 kg (169 lb 11.2 oz)   LMP 03/07/2024 (Exact Date)   SpO2 98%   BMI 28.86 kg/m  "   Body mass index is 28.86 kg/m .  Physical Exam   GENERAL: alert and no distress  HENT:  Right: ear canals and TM's normal, left ear canal normal but unable to see left TM 2/2 cerumen impaction, nose and mouth without ulcers or lesions  NECK: no adenopathy, no asymmetry, masses, or scars  RESP: lungs clear to auscultation - no rales, rhonchi or wheezes  BREAST: normal without masses, tenderness or nipple discharge and no palpable axillary masses or adenopathy. (+) Soft, mobile,~0.5 cm circular mass noted in the right axilla.  No overlying erythema, no drainage, no follicular cyst associated with it.         Signed Electronically by: Bria Pickett DO

## 2024-03-13 ENCOUNTER — TELEPHONE (OUTPATIENT)
Dept: INTERNAL MEDICINE | Facility: CLINIC | Age: 29
End: 2024-03-13
Payer: COMMERCIAL

## 2024-03-13 DIAGNOSIS — R22.31 AXILLARY MASS, RIGHT: Primary | ICD-10-CM

## 2024-03-13 NOTE — TELEPHONE ENCOUNTER
[facility] with imaging calling in regards to ultrasound order. Pt saw Dr. Pickett yesterday and US HEAD NECK was ordered. Dx is Mass of right axilla.     [facility] states would recommend order for Breast US Bilateral.     Routing to provider to place new order.

## 2024-03-17 ENCOUNTER — TRANSFERRED RECORDS (OUTPATIENT)
Dept: HEALTH INFORMATION MANAGEMENT | Facility: CLINIC | Age: 29
End: 2024-03-17
Payer: COMMERCIAL

## 2024-03-20 ENCOUNTER — ANCILLARY PROCEDURE (OUTPATIENT)
Dept: MAMMOGRAPHY | Facility: CLINIC | Age: 29
End: 2024-03-20
Attending: STUDENT IN AN ORGANIZED HEALTH CARE EDUCATION/TRAINING PROGRAM
Payer: COMMERCIAL

## 2024-03-20 DIAGNOSIS — R22.31 AXILLARY MASS, RIGHT: ICD-10-CM

## 2024-03-20 PROCEDURE — 76642 ULTRASOUND BREAST LIMITED: CPT | Mod: RT

## 2024-03-25 ENCOUNTER — OFFICE VISIT (OUTPATIENT)
Dept: FAMILY MEDICINE | Facility: CLINIC | Age: 29
End: 2024-03-25
Payer: COMMERCIAL

## 2024-03-25 VITALS
RESPIRATION RATE: 16 BRPM | TEMPERATURE: 98.2 F | HEART RATE: 91 BPM | SYSTOLIC BLOOD PRESSURE: 122 MMHG | OXYGEN SATURATION: 100 % | DIASTOLIC BLOOD PRESSURE: 82 MMHG

## 2024-03-25 DIAGNOSIS — R30.0 DYSURIA: Primary | ICD-10-CM

## 2024-03-25 LAB
BACTERIA #/AREA URNS HPF: ABNORMAL /HPF
RBC #/AREA URNS AUTO: ABNORMAL /HPF
SQUAMOUS #/AREA URNS AUTO: ABNORMAL /LPF
WBC #/AREA URNS AUTO: ABNORMAL /HPF
WBC CLUMPS #/AREA URNS HPF: PRESENT /HPF

## 2024-03-25 PROCEDURE — 81015 MICROSCOPIC EXAM OF URINE: CPT | Performed by: STUDENT IN AN ORGANIZED HEALTH CARE EDUCATION/TRAINING PROGRAM

## 2024-03-25 PROCEDURE — 99213 OFFICE O/P EST LOW 20 MIN: CPT | Performed by: STUDENT IN AN ORGANIZED HEALTH CARE EDUCATION/TRAINING PROGRAM

## 2024-03-25 RX ORDER — SULFAMETHOXAZOLE/TRIMETHOPRIM 800-160 MG
1 TABLET ORAL 2 TIMES DAILY
Qty: 6 TABLET | Refills: 0 | Status: SHIPPED | OUTPATIENT
Start: 2024-03-25 | End: 2024-03-28

## 2024-03-25 NOTE — PATIENT INSTRUCTIONS
Your symptoms are concerning for urinary tract infection.  We will start you on an antibiotic now and depending on what bug we find in the next few days, if we need to adjust it we will give you a call.

## 2024-03-25 NOTE — PROGRESS NOTES
Patient presents with:  Urinary Problem: Sx started today morning. Back pain. Burning and irration in bladder. Frequency. Taking AZO.      Clinical Decision Making: Patient's clinical history and urinalysis are concerning for urinary tract infection which I will empirically treat with 3-day course of Bactrim.  Will adjust as needed be based on culture results.  Not concerned for pyelonephritis in the absence of flank pain, fever, chills or other signs of systemic illness.      ICD-10-CM    1. Dysuria  R30.0 UA Microscopic with Reflex to Culture     sulfamethoxazole-trimethoprim (BACTRIM DS) 800-160 MG tablet     CANCELED: UA Macroscopic with reflex to Microscopic and Culture - Clinic Collect          Patient Instructions   Your symptoms are concerning for urinary tract infection.  We will start you on an antibiotic now and depending on what bug we find in the next few days, if we need to adjust it we will give you a call.    HPI:  Maria Fernanda Payton is a 28 year old female who presents today complaining of dysuria.    Since this morning she has had dysuria and increased urinary frequency.  No vaginal discharge or vaginal itchiness or pain.  She has regular periods.  Sexually active with a partner with whom she was previously sexually active several months ago recently.  Does note that in the past she had several STDs, often after sexual activity.  Last UTI was approximately 1 year ago.    No pertinent medical or surgical issues.  Allergic to amoxicillin.  No pertinent family history.  Lives at home with a roommate.    History obtained from the patient.    Problem List:  2023-10: Routine general medical examination at a health care facility  2016-06: HSV infection  Seasonal allergic rhinitis      No past medical history on file.    Social History     Tobacco Use    Smoking status: Never     Passive exposure: Never    Smokeless tobacco: Never   Substance Use Topics    Alcohol use: Not on file         Vitals:     03/25/24 1745   BP: 122/82   Pulse: 91   Resp: 16   Temp: 98.2  F (36.8  C)   TempSrc: Oral   SpO2: 100%       Physical Exam  Constitutional:       Appearance: Normal appearance.   HENT:      Head: Normocephalic and atraumatic.      Right Ear: External ear normal.      Left Ear: External ear normal.      Nose: Nose normal.      Mouth/Throat:      Mouth: Mucous membranes are moist.   Eyes:      Conjunctiva/sclera: Conjunctivae normal.   Cardiovascular:      Rate and Rhythm: Normal rate and regular rhythm.      Pulses: Normal pulses.      Heart sounds: Normal heart sounds.   Pulmonary:      Effort: Pulmonary effort is normal.      Breath sounds: Normal breath sounds.   Abdominal:      General: Abdomen is flat. Bowel sounds are normal.      Palpations: Abdomen is soft.      Tenderness: There is no right CVA tenderness or left CVA tenderness.   Musculoskeletal:         General: Normal range of motion.   Skin:     General: Skin is warm.      Capillary Refill: Capillary refill takes less than 2 seconds.   Neurological:      General: No focal deficit present.      Mental Status: She is alert.   Psychiatric:         Mood and Affect: Mood normal.         Thought Content: Thought content normal.         Results:  Results for orders placed or performed in visit on 03/25/24   UA Microscopic with Reflex to Culture     Status: Abnormal   Result Value Ref Range    Bacteria Urine Moderate (A) None Seen /HPF    RBC Urine None Seen 0-2 /HPF /HPF    WBC Urine 5-10 (A) 0-5 /HPF /HPF    Squamous Epithelials Urine Few (A) None Seen /LPF    WBC Clumps Urine Present (A) None Seen /HPF    Narrative    Urine Culture not indicated         At the end of the encounter, I discussed results, diagnosis, medications. Discussed red flags for immediate return to clinic/ER, as well as indications for follow up if no improvement. Patient understood and agreed to plan. Patient was stable for discharge.

## 2024-04-08 ENCOUNTER — TELEPHONE (OUTPATIENT)
Dept: INTERNAL MEDICINE | Facility: CLINIC | Age: 29
End: 2024-04-08
Payer: COMMERCIAL

## 2024-10-04 ENCOUNTER — OFFICE VISIT (OUTPATIENT)
Dept: FAMILY MEDICINE | Facility: CLINIC | Age: 29
End: 2024-10-04
Payer: MEDICAID

## 2024-10-04 VITALS
RESPIRATION RATE: 20 BRPM | OXYGEN SATURATION: 98 % | DIASTOLIC BLOOD PRESSURE: 91 MMHG | HEART RATE: 78 BPM | SYSTOLIC BLOOD PRESSURE: 135 MMHG | TEMPERATURE: 98.2 F

## 2024-10-04 DIAGNOSIS — N30.90 CYSTITIS: Primary | ICD-10-CM

## 2024-10-04 LAB
BACTERIA #/AREA URNS HPF: ABNORMAL /HPF
CLUE CELLS: ABNORMAL
RBC #/AREA URNS AUTO: ABNORMAL /HPF
SQUAMOUS #/AREA URNS AUTO: ABNORMAL /LPF
TRICHOMONAS, WET PREP: ABNORMAL
WBC #/AREA URNS AUTO: ABNORMAL /HPF
WBC'S/HIGH POWER FIELD, WET PREP: ABNORMAL
YEAST, WET PREP: ABNORMAL

## 2024-10-04 PROCEDURE — 81015 MICROSCOPIC EXAM OF URINE: CPT | Performed by: FAMILY MEDICINE

## 2024-10-04 PROCEDURE — 99213 OFFICE O/P EST LOW 20 MIN: CPT | Performed by: FAMILY MEDICINE

## 2024-10-04 PROCEDURE — 87210 SMEAR WET MOUNT SALINE/INK: CPT | Performed by: FAMILY MEDICINE

## 2024-10-04 RX ORDER — NITROFURANTOIN 25; 75 MG/1; MG/1
100 CAPSULE ORAL 2 TIMES DAILY
Qty: 14 CAPSULE | Refills: 0 | Status: SHIPPED | OUTPATIENT
Start: 2024-10-04 | End: 2024-10-11

## 2024-10-04 ASSESSMENT — PAIN SCALES - GENERAL: PAINLEVEL: EXTREME PAIN (8)

## 2025-06-06 SDOH — HEALTH STABILITY: PHYSICAL HEALTH: ON AVERAGE, HOW MANY MINUTES DO YOU ENGAGE IN EXERCISE AT THIS LEVEL?: 40 MIN

## 2025-06-06 SDOH — HEALTH STABILITY: PHYSICAL HEALTH: ON AVERAGE, HOW MANY DAYS PER WEEK DO YOU ENGAGE IN MODERATE TO STRENUOUS EXERCISE (LIKE A BRISK WALK)?: 4 DAYS

## 2025-06-06 ASSESSMENT — SOCIAL DETERMINANTS OF HEALTH (SDOH): HOW OFTEN DO YOU GET TOGETHER WITH FRIENDS OR RELATIVES?: TWICE A WEEK

## 2025-06-11 ENCOUNTER — OFFICE VISIT (OUTPATIENT)
Dept: FAMILY MEDICINE | Facility: CLINIC | Age: 30
End: 2025-06-11
Payer: COMMERCIAL

## 2025-06-11 VITALS
SYSTOLIC BLOOD PRESSURE: 112 MMHG | HEART RATE: 96 BPM | RESPIRATION RATE: 20 BRPM | DIASTOLIC BLOOD PRESSURE: 62 MMHG | TEMPERATURE: 97.7 F | OXYGEN SATURATION: 100 % | WEIGHT: 151.3 LBS | HEIGHT: 64 IN | BODY MASS INDEX: 25.83 KG/M2

## 2025-06-11 DIAGNOSIS — R19.5 LOOSE STOOLS: ICD-10-CM

## 2025-06-11 DIAGNOSIS — N39.0 RECURRENT UTI: ICD-10-CM

## 2025-06-11 DIAGNOSIS — Z13.1 SCREENING FOR DIABETES MELLITUS: ICD-10-CM

## 2025-06-11 DIAGNOSIS — Z00.00 ROUTINE GENERAL MEDICAL EXAMINATION AT A HEALTH CARE FACILITY: Primary | ICD-10-CM

## 2025-06-11 LAB
ALBUMIN SERPL BCG-MCNC: 4.6 G/DL (ref 3.5–5.2)
ALBUMIN UR-MCNC: NEGATIVE MG/DL
ALP SERPL-CCNC: 100 U/L (ref 40–150)
ALT SERPL W P-5'-P-CCNC: 56 U/L (ref 0–50)
ANION GAP SERPL CALCULATED.3IONS-SCNC: 12 MMOL/L (ref 7–15)
APPEARANCE UR: CLEAR
AST SERPL W P-5'-P-CCNC: 43 U/L (ref 0–45)
BACTERIA #/AREA URNS HPF: ABNORMAL /HPF
BASOPHILS # BLD AUTO: 0 10E3/UL (ref 0–0.2)
BASOPHILS NFR BLD AUTO: 0 %
BILIRUB SERPL-MCNC: 0.5 MG/DL
BILIRUB UR QL STRIP: NEGATIVE
BUN SERPL-MCNC: 11.4 MG/DL (ref 6–20)
CALCIUM SERPL-MCNC: 9.6 MG/DL (ref 8.8–10.4)
CHLORIDE SERPL-SCNC: 105 MMOL/L (ref 98–107)
COLOR UR AUTO: YELLOW
CREAT SERPL-MCNC: 0.66 MG/DL (ref 0.51–0.95)
EGFRCR SERPLBLD CKD-EPI 2021: >90 ML/MIN/1.73M2
EOSINOPHIL # BLD AUTO: 0.1 10E3/UL (ref 0–0.7)
EOSINOPHIL NFR BLD AUTO: 2 %
ERYTHROCYTE [DISTWIDTH] IN BLOOD BY AUTOMATED COUNT: 11.8 % (ref 10–15)
EST. AVERAGE GLUCOSE BLD GHB EST-MCNC: 91 MG/DL
FASTING STATUS PATIENT QL REPORTED: YES
GLUCOSE SERPL-MCNC: 95 MG/DL (ref 70–99)
GLUCOSE UR STRIP-MCNC: NEGATIVE MG/DL
HBA1C MFR BLD: 4.8 % (ref 0–5.6)
HCO3 SERPL-SCNC: 24 MMOL/L (ref 22–29)
HCT VFR BLD AUTO: 40.6 % (ref 35–47)
HGB BLD-MCNC: 13.8 G/DL (ref 11.7–15.7)
HGB UR QL STRIP: ABNORMAL
IMM GRANULOCYTES # BLD: 0 10E3/UL
IMM GRANULOCYTES NFR BLD: 0 %
KETONES UR STRIP-MCNC: NEGATIVE MG/DL
LEUKOCYTE ESTERASE UR QL STRIP: NEGATIVE
LYMPHOCYTES # BLD AUTO: 2.1 10E3/UL (ref 0.8–5.3)
LYMPHOCYTES NFR BLD AUTO: 39 %
MCH RBC QN AUTO: 31.4 PG (ref 26.5–33)
MCHC RBC AUTO-ENTMCNC: 34 G/DL (ref 31.5–36.5)
MCV RBC AUTO: 92 FL (ref 78–100)
MONOCYTES # BLD AUTO: 0.4 10E3/UL (ref 0–1.3)
MONOCYTES NFR BLD AUTO: 8 %
NEUTROPHILS # BLD AUTO: 2.8 10E3/UL (ref 1.6–8.3)
NEUTROPHILS NFR BLD AUTO: 51 %
NITRATE UR QL: NEGATIVE
PH UR STRIP: 7 [PH] (ref 5–7)
PLATELET # BLD AUTO: 327 10E3/UL (ref 150–450)
POTASSIUM SERPL-SCNC: 4.4 MMOL/L (ref 3.4–5.3)
PROT SERPL-MCNC: 7.6 G/DL (ref 6.4–8.3)
RBC # BLD AUTO: 4.4 10E6/UL (ref 3.8–5.2)
RBC #/AREA URNS AUTO: ABNORMAL /HPF
SODIUM SERPL-SCNC: 141 MMOL/L (ref 135–145)
SP GR UR STRIP: 1.01 (ref 1–1.03)
SQUAMOUS #/AREA URNS AUTO: ABNORMAL /LPF
TSH SERPL DL<=0.005 MIU/L-ACNC: 1.49 UIU/ML (ref 0.3–4.2)
UROBILINOGEN UR STRIP-ACNC: 0.2 E.U./DL
WBC # BLD AUTO: 5.5 10E3/UL (ref 4–11)
WBC #/AREA URNS AUTO: ABNORMAL /HPF

## 2025-06-11 PROCEDURE — 81001 URINALYSIS AUTO W/SCOPE: CPT | Performed by: INTERNAL MEDICINE

## 2025-06-11 PROCEDURE — 82784 ASSAY IGA/IGD/IGG/IGM EACH: CPT | Performed by: INTERNAL MEDICINE

## 2025-06-11 PROCEDURE — 80061 LIPID PANEL: CPT | Performed by: INTERNAL MEDICINE

## 2025-06-11 PROCEDURE — 99213 OFFICE O/P EST LOW 20 MIN: CPT | Mod: 25 | Performed by: INTERNAL MEDICINE

## 2025-06-11 PROCEDURE — 3074F SYST BP LT 130 MM HG: CPT | Performed by: INTERNAL MEDICINE

## 2025-06-11 PROCEDURE — 86364 TISS TRNSGLTMNASE EA IG CLAS: CPT | Performed by: INTERNAL MEDICINE

## 2025-06-11 PROCEDURE — 99395 PREV VISIT EST AGE 18-39: CPT | Performed by: INTERNAL MEDICINE

## 2025-06-11 PROCEDURE — 3044F HG A1C LEVEL LT 7.0%: CPT | Performed by: INTERNAL MEDICINE

## 2025-06-11 PROCEDURE — 3078F DIAST BP <80 MM HG: CPT | Performed by: INTERNAL MEDICINE

## 2025-06-11 PROCEDURE — 84443 ASSAY THYROID STIM HORMONE: CPT | Performed by: INTERNAL MEDICINE

## 2025-06-11 PROCEDURE — 36415 COLL VENOUS BLD VENIPUNCTURE: CPT | Performed by: INTERNAL MEDICINE

## 2025-06-11 PROCEDURE — 83036 HEMOGLOBIN GLYCOSYLATED A1C: CPT | Performed by: INTERNAL MEDICINE

## 2025-06-11 PROCEDURE — 80053 COMPREHEN METABOLIC PANEL: CPT | Performed by: INTERNAL MEDICINE

## 2025-06-11 PROCEDURE — 85025 COMPLETE CBC W/AUTO DIFF WBC: CPT | Performed by: INTERNAL MEDICINE

## 2025-06-11 RX ORDER — BIOTIN 10000 MCG
CAPSULE ORAL
COMMUNITY
Start: 2025-06-11

## 2025-06-11 NOTE — PROGRESS NOTES
"Preventive Care Visit  [facility]  Sheylatrell Orellana DO, Internal Medicine  Jun 11, 2025      Assessment & Plan     (Z00.00) Routine general medical examination at a health care facility  (primary encounter diagnosis)  Comment:   Plan: Comprehensive metabolic panel (BMP + Alb, Alk         Phos, ALT, AST, Total. Bili, TP), CBC with         platelets and differential, Lipid panel reflex         to direct LDL Fasting, CANCELED: Lipid panel         reflex to direct LDL Non-fasting  Pap: 5/1/23- repeat in 3 years  Immunizations: Declines COVID  Labs: Lipids, diabetes screen- normal 2023  Discussed healthy lifestyle and aging recommendations including regular exercise, 5+ fruits and veggies daily.    (Z13.1) Screening for diabetes mellitus  Comment:   Plan: Hemoglobin A1c    (R19.5) Loose stools  Comment: In the last 5 years- maybe vaping related?  Maybe IBS?  Check some initial labs today.  Plan: Tissue transglutaminase brittany IgA and IgG, IgA,         TSH with free T4 reflex    (N39.0) Recurrent UTI  Comment: Cont D-mannose.  No constipation, does postcoital urination; no fam history- if recurs, consider renal/bladder US.  Check Cr, UA.  Plan: UA Macroscopic with reflex to Microscopic and         Culture - Lab Collect, UA Microscopic with         Reflex to Culture       Patient has been advised of split billing requirements and indicates understanding: Yes        BMI  Estimated body mass index is 25.74 kg/m  as calculated from the following:    Height as of this encounter: 1.633 m (5' 4.29\").    Weight as of this encounter: 68.6 kg (151 lb 4.8 oz).       Counseling  Appropriate preventive services were addressed with this patient via screening, questionnaire, or discussion as appropriate for fall prevention, nutrition, physical activity, Tobacco-use cessation, social engagement, weight loss and cognition.  Checklist reviewing preventive services available has been given to the " patient.  Reviewed patient's diet, addressing concerns and/or questions.         Olivia Irving is a 29 year old, presenting for the following:  Physical        6/11/2025     2:33 PM   Additional Questions   Roomed by krystle   Accompanied by self          HPI    Has a history of chronic UTI- started D-mannose and that has helped.  Last UTI was in October 2024.    Has runnier stool.  Sometimes has stomach cramping.  Happens a few times per week-  but also has normal stools during week.    Vapes- would like to quit.  Has bad breath.  Runny nose. Cough.           Advance Care Planning    Discussed advance care planning with patient; however, patient declined at this time.        6/6/2025   General Health   How would you rate your overall physical health? Good   Feel stress (tense, anxious, or unable to sleep) Only a little   (!) STRESS CONCERN      6/6/2025   Nutrition   Three or more servings of calcium each day? Yes   Diet: Regular (no restrictions)   How many servings of fruit and vegetables per day? (!) 2-3   How many sweetened beverages each day? 0-1         6/6/2025   Exercise   Days per week of moderate/strenous exercise 4 days   Average minutes spent exercising at this level 40 min         6/6/2025   Social Factors   Frequency of gathering with friends or relatives Twice a week   Worry food won't last until get money to buy more No   Food not last or not have enough money for food? No   Do you have housing? (Housing is defined as stable permanent housing and does not include staying outside in a car, in a tent, in an abandoned building, in an overnight shelter, or couch-surfing.) No   Are you worried about losing your housing? No   Lack of transportation? No   Unable to get utilities (heat,electricity)? No   Want help with housing or utility concern? No   (!) HOUSING CONCERN PRESENT      6/6/2025   Dental   Dentist two times every year? Yes         Today's PHQ-2 Score:       6/10/2025     2:59 PM   PHQ-2  ( 1999 [employer])   Q1: Little interest or pleasure in doing things 0   Q2: Feeling down, depressed or hopeless 0   PHQ-2 Score 0    Q1: Little interest or pleasure in doing things Not at all   Q2: Feeling down, depressed or hopeless Not at all   PHQ-2 Score 0       Patient-reported           6/6/2025   Substance Use   Alcohol more than 3/day or more than 7/wk No   Do you use any other substances recreationally? No     Social History     Tobacco Use    Smoking status: Never     Passive exposure: Never    Smokeless tobacco: Never    Tobacco comments:     1 cart per 10 days   Vaping Use    Vaping status: Former   Substance Use Topics    Alcohol use: Yes     Comment: I drink maybe once or twice a month    Drug use: Never           3/20/2024   LAST FHS-7 RESULTS   1st degree relative breast or ovarian cancer No   Any relative bilateral breast cancer No   Any male have breast cancer No   Any ONE woman have BOTH breast AND ovarian cancer Yes   Any woman with breast cancer before 50yrs No   2 or more relatives with breast AND/OR ovarian cancer No   2 or more relatives with breast AND/OR bowel cancer No        Mammogram Screening - Patient under 40 years of age: Routine Mammogram Screening not recommended.         6/6/2025   STI Screening   New sexual partner(s) since last STI/HIV test? (!) YES      History of abnormal Pap smear: No - age 21-29 PAP every 3 years recommended        Latest Ref Rng & Units 1/3/2020    12:55 PM   PAP / HPV   PAP Negative for squamous intraepithelial lesion or malignancy. Negative for squamous intraepithelial lesion or malignancy  Electronically signed by Sandra Nguyen CT (ASCP) on 1/7/2020 at  9:05 AM              6/6/2025   Contraception/Family Planning   Questions about contraception or family planning No        Reviewed and updated as needed this visit by Provider   Tobacco   Meds     Fam Hx            No past medical history on file.  Past Surgical History:   Procedure Laterality  "Date    wisdom teeth removal       Lab work is in process         Objective    Exam  /62   Pulse 96   Temp 97.7  F (36.5  C) (Temporal)   Resp 20   Ht 1.633 m (5' 4.29\")   Wt 68.6 kg (151 lb 4.8 oz)   LMP 06/06/2025   SpO2 100%   BMI 25.74 kg/m     Estimated body mass index is 25.74 kg/m  as calculated from the following:    Height as of this encounter: 1.633 m (5' 4.29\").    Weight as of this encounter: 68.6 kg (151 lb 4.8 oz).    Physical Exam  GENERAL: alert and no distress  EYES: Eyes grossly normal to inspection, PERRL and conjunctivae and sclerae normal  HENT: ear canals and TM's normal, nose and mouth without ulcers or lesions  NECK: no adenopathy, no asymmetry, masses, or scars  RESP: lungs clear to auscultation - no rales, rhonchi or wheezes  BREAST: normal without masses, tenderness or nipple discharge and no palpable axillary masses or adenopathy  CV: regular rate and rhythm, normal S1 S2, no S3 or S4, no murmur, click or rub, no peripheral edema  ABDOMEN: soft, nontender, no hepatosplenomegaly, no masses and bowel sounds normal  MS: no gross musculoskeletal defects noted, no edema  SKIN: no suspicious lesions or rashes  NEURO: Normal strength and tone, mentation intact and speech normal  PSYCH: mentation appears normal, affect normal/bright        Signed Electronically by: Sheyla Orellana,     "

## 2025-06-11 NOTE — PATIENT INSTRUCTIONS
To help improve your cough/sinus symptoms, I recommend:    - Flonase two sprays into each nostril once daily  - Start an antihistamine like Zyrtec (cetirizine) 10 mg daily, Claritin (loratadine) 10 mg daily, or Allegra (fexofenadine) 160 mg daily- this can help dry up secretions  - Sinus irrigation (as long as you don't have a history of sinus or ear surgery) with a Neti pot.  Use distilled or boiled/cooled tap water (not water straight from the tap) and then mix with salt packets that come with the Neti pot- use at least once daily to help clear out secretions so they don't drip down the back of your throat and cause you to cough.    Technique on Flonase:    How to spray your nasal steroid spray:    Tilt your head forward. Spray the nasal spray up your nose and tilt the spray towards your ears. Spray 2 sprays per nostril. Inhale gently. Dab excess nasal spray with tissue. Remain upright for at least 1 hour. Use nasal spray once daily.    You should never taste the nasal spray dripping down your throat. If you do, rinse out your mouth well and try tilting your head more forward the next time you use your spray.    Some people find it easier to spray 1 spray per nostril twice daily.    Recommend spraying your nasal spray straight into your nose (nose to toes), angle out towards your ears to avoid hitting the septum, breathe in while you spray.    Flonase is a steroid nasal spray that works as an anti-inflammatory to open up the nasal passages and decrease the amount of drainage from your nose and sinuses.  It is only effective when used consistently.  It may take 2-3 weeks of consistent use to reach it optimal effect.      Patient Education   Preventive Care Advice   This is general advice given by our system to help you stay healthy. However, your care team may have specific advice just for you. Please talk to your care team about your preventive care needs.  Nutrition  Eat 5 or more servings of fruits and  vegetables each day.  Try wheat bread, brown rice and whole grain pasta (instead of white bread, rice, and pasta).  Get enough calcium and vitamin D. Check the label on foods and aim for 100% of the RDA (recommended daily allowance).  Lifestyle  Exercise at least 150 minutes each week  (30 minutes a day, 5 days a week).  Do muscle strengthening activities 2 days a week. These help control your weight and prevent disease.  No smoking.  Wear sunscreen to prevent skin cancer.  Have a dental exam and cleaning every 6 months.  Yearly exams  See your health care team every year to talk about:  Any changes in your health.  Any medicines your care team has prescribed.  Preventive care, family planning, and ways to prevent chronic diseases.  Shots (vaccines)   HPV shots (up to age 26), if you've never had them before.  Hepatitis B shots (up to age 59), if you've never had them before.  COVID-19 shot: Get this shot when it's due.  Flu shot: Get a flu shot every year.  Tetanus shot: Get a tetanus shot every 10 years.  Pneumococcal, hepatitis A, and RSV shots: Ask your care team if you need these based on your risk.  Shingles shot (for age 50 and up)  General health tests  Diabetes screening:  Starting at age 35, Get screened for diabetes at least every 3 years.  If you are younger than age 35, ask your care team if you should be screened for diabetes.  Cholesterol test: At age 39, start having a cholesterol test every 5 years, or more often if advised.  Bone density scan (DEXA): At age 50, ask your care team if you should have this scan for osteoporosis (brittle bones).  Hepatitis C: Get tested at least once in your life.  STIs (sexually transmitted infections)  Before age 24: Ask your care team if you should be screened for STIs.  After age 24: Get screened for STIs if you're at risk. You are at risk for STIs (including HIV) if:  You are sexually active with more than one person.  You don't use condoms every time.  You or a  partner was diagnosed with a sexually transmitted infection.  If you are at risk for HIV, ask about PrEP medicine to prevent HIV.  Get tested for HIV at least once in your life, whether you are at risk for HIV or not.  Cancer screening tests  Cervical cancer screening: If you have a cervix, begin getting regular cervical cancer screening tests starting at age 21.  Breast cancer scan (mammogram): If you've ever had breasts, begin having regular mammograms starting at age 40. This is a scan to check for breast cancer.  Colon cancer screening: It is important to start screening for colon cancer at age 45.  Have a colonoscopy test every 10 years (or more often if you're at risk) Or, ask your provider about stool tests like a FIT test every year or Cologuard test every 3 years.  To learn more about your testing options, visit:   .  For help making a decision, visit:   https://bit.ly/ue80195.  Prostate cancer screening test: If you have a prostate, ask your care team if a prostate cancer screening test (PSA) at age 55 is right for you.  Lung cancer screening: If you are a current or former smoker ages 50 to 80, ask your care team if ongoing lung cancer screenings are right for you.  For informational purposes only. Not to replace the advice of your health care provider. Copyright   2023 [employer]. All rights reserved. Clinically reviewed by the [facility] Transitions Program. [employer] [id number] - REV 01/24.

## 2025-06-12 LAB
CHOLEST SERPL-MCNC: 135 MG/DL
FASTING STATUS PATIENT QL REPORTED: YES
HDLC SERPL-MCNC: 46 MG/DL
IGA SERPL-MCNC: 467 MG/DL (ref 84–499)
LDLC SERPL CALC-MCNC: 68 MG/DL
NONHDLC SERPL-MCNC: 89 MG/DL
TRIGL SERPL-MCNC: 103 MG/DL
TTG IGA SER-ACNC: 0.6 U/ML
TTG IGG SER-ACNC: <0.6 U/ML

## 2025-06-16 ENCOUNTER — RESULTS FOLLOW-UP (OUTPATIENT)
Dept: FAMILY MEDICINE | Facility: CLINIC | Age: 30
End: 2025-06-16

## 2025-06-16 DIAGNOSIS — R79.89 ELEVATED LFTS: Primary | ICD-10-CM
